# Patient Record
Sex: MALE | Employment: UNEMPLOYED | URBAN - METROPOLITAN AREA
[De-identification: names, ages, dates, MRNs, and addresses within clinical notes are randomized per-mention and may not be internally consistent; named-entity substitution may affect disease eponyms.]

---

## 2021-02-22 ENCOUNTER — TRANSFERRED RECORDS (OUTPATIENT)
Dept: HEALTH INFORMATION MANAGEMENT | Facility: CLINIC | Age: 31
End: 2021-02-22

## 2021-02-22 LAB
CREAT SERPL-MCNC: 0.85 MG/DL (ref 0.6–1.3)
GFR SERPL CREATININE-BSD FRML MDRD: 105 ML/MIN/1.73M2
GLUCOSE SERPL-MCNC: 138 MG/DL (ref 78–113)
PHQ9 SCORE: 9
POTASSIUM SERPL-SCNC: 3.9 MMOL/L (ref 3.7–5)
TSH SERPL-ACNC: 1.5 UIU/ML (ref 0.5–6)

## 2021-02-23 ENCOUNTER — HOSPITAL ENCOUNTER (INPATIENT)
Facility: HOSPITAL | Age: 31
LOS: 3 days | Discharge: NURSING, REHAB OR LONG-TERM CARE | End: 2021-02-26
Attending: PSYCHIATRY & NEUROLOGY | Admitting: PSYCHIATRY & NEUROLOGY
Payer: COMMERCIAL

## 2021-02-23 ENCOUNTER — TRANSFERRED RECORDS (OUTPATIENT)
Dept: HEALTH INFORMATION MANAGEMENT | Facility: CLINIC | Age: 31
End: 2021-02-23

## 2021-02-23 ENCOUNTER — TELEPHONE (OUTPATIENT)
Dept: BEHAVIORAL HEALTH | Facility: CLINIC | Age: 31
End: 2021-02-23

## 2021-02-23 PROBLEM — F29 PSYCHOSIS (H): Status: ACTIVE | Noted: 2021-02-23

## 2021-02-23 PROBLEM — Z79.899 HIGH RISK MEDICATION USE: Status: ACTIVE | Noted: 2018-08-28

## 2021-02-23 PROBLEM — J45.20 MILD INTERMITTENT ASTHMA IN ADULT WITHOUT COMPLICATION: Status: ACTIVE | Noted: 2021-02-23

## 2021-02-23 PROCEDURE — 99223 1ST HOSP IP/OBS HIGH 75: CPT | Performed by: NURSE PRACTITIONER

## 2021-02-23 PROCEDURE — 124N000001 HC R&B MH

## 2021-02-23 RX ORDER — ACETAMINOPHEN 325 MG/1
650 TABLET ORAL EVERY 4 HOURS PRN
Status: DISCONTINUED | OUTPATIENT
Start: 2021-02-23 | End: 2021-02-26 | Stop reason: HOSPADM

## 2021-02-23 RX ORDER — ALBUTEROL SULFATE 90 UG/1
2 AEROSOL, METERED RESPIRATORY (INHALATION) EVERY 6 HOURS PRN
Status: DISCONTINUED | OUTPATIENT
Start: 2021-02-23 | End: 2021-02-26 | Stop reason: HOSPADM

## 2021-02-23 RX ORDER — LANOLIN ALCOHOL/MO/W.PET/CERES
3-6 CREAM (GRAM) TOPICAL
Status: DISCONTINUED | OUTPATIENT
Start: 2021-02-23 | End: 2021-02-26 | Stop reason: HOSPADM

## 2021-02-23 RX ORDER — HYDROXYZINE HYDROCHLORIDE 25 MG/1
50-100 TABLET, FILM COATED ORAL EVERY 6 HOURS PRN
Status: DISCONTINUED | OUTPATIENT
Start: 2021-02-23 | End: 2021-02-26 | Stop reason: HOSPADM

## 2021-02-23 RX ORDER — OLANZAPINE 5 MG/1
5-10 TABLET ORAL 3 TIMES DAILY PRN
Status: DISCONTINUED | OUTPATIENT
Start: 2021-02-23 | End: 2021-02-26 | Stop reason: HOSPADM

## 2021-02-23 RX ORDER — IBUPROFEN 200 MG
200-400 TABLET ORAL EVERY 6 HOURS PRN
Status: DISCONTINUED | OUTPATIENT
Start: 2021-02-23 | End: 2021-02-24

## 2021-02-23 RX ORDER — POLYETHYLENE GLYCOL 3350 17 G/17G
17 POWDER, FOR SOLUTION ORAL DAILY PRN
Status: DISCONTINUED | OUTPATIENT
Start: 2021-02-23 | End: 2021-02-26 | Stop reason: HOSPADM

## 2021-02-23 RX ORDER — TRAZODONE HYDROCHLORIDE 50 MG/1
50 TABLET, FILM COATED ORAL
Status: DISCONTINUED | OUTPATIENT
Start: 2021-02-23 | End: 2021-02-23

## 2021-02-23 RX ORDER — MAGNESIUM HYDROXIDE/ALUMINUM HYDROXICE/SIMETHICONE 120; 1200; 1200 MG/30ML; MG/30ML; MG/30ML
30 SUSPENSION ORAL EVERY 4 HOURS PRN
Status: DISCONTINUED | OUTPATIENT
Start: 2021-02-23 | End: 2021-02-26 | Stop reason: HOSPADM

## 2021-02-23 ASSESSMENT — ACTIVITIES OF DAILY LIVING (ADL)
DRESS: SCRUBS (BEHAVIORAL HEALTH)
DOING_ERRANDS_INDEPENDENTLY_DIFFICULTY: NO
FALL_HISTORY_WITHIN_LAST_SIX_MONTHS: NO
HYGIENE/GROOMING: INDEPENDENT;SHOWER
LAUNDRY: UNABLE TO COMPLETE
ORAL_HYGIENE: INDEPENDENT
HYGIENE/GROOMING: INDEPENDENT
DRESS: INDEPENDENT;SCRUBS (BEHAVIORAL HEALTH)
ORAL_HYGIENE: INDEPENDENT

## 2021-02-23 ASSESSMENT — MIFFLIN-ST. JEOR: SCORE: 1734.71
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
## 2021-02-23 NOTE — PROGRESS NOTES
02/23/21 1326   Patient Belongings   Did you bring any home meds/supplements to the hospital?  Yes   Disposition of meds  Sent to security/pharmacy per site process   Patient Belongings locker;sent to security per site process   Patient Belongings Put in Hospital Secure Location (Security or Locker, etc.) clothing;medication(s);shoes   Belongings Search Yes   Clothing Search Yes   Second Staff Alba RUBIN   Comment jorgensen vest, brown belt, brown leather jacket, blue tennis shoes, grey jeans, grey and black veronica t-shirt, white tank top, 1 black sock, 1 striped sock, 1 grey glove, 1 white mask, sunglasses, oricale book and cards, altiods can with 2 dice and several misc. items, bic lighter,razor, container with silver chain, safety pin and several charms, 1 chop stick, bottle of refresher oil, bag with 1 rock, hair tie, earring, purple rubber band   List items sent to safe: [place] instruction permit    2 items sent to weapons security: pocket knife and file    All other belongings put in assigned cubby in belongings room.       I have reviewed my belongings list on admission and verify that it is correct.     Patient signature_______________________________    Second staff witness (if patient unable to sign) ______________________________       I have received all my belongings at discharge.    Patient signature________________________________    Elise   2/23/2021  1:34 PM

## 2021-02-23 NOTE — PLAN OF CARE
"ADMISSION NOTE    Reason for admission- psychosis  Safety concerns- 15 minute safety checks  Risk for or history of violence -none reported.   Full skin assessment: completed, no skin issues noted, faint superficial laceration marks on inner aspect of left wrist.    Patient arrived on unit from [facility] accompanied by ambulance crew and security on 2/23/2021  12:24 PM.   Status on arrival: awake, alert, semi-cooperative.  Temp 98.7  F (37.1  C) (Tympanic)   Resp 20   Ht 1.76 m (5' 9.29\")   Wt 78.5 kg (173 lb)   BMI 25.33 kg/m    Patient given tour of unit and Welcome to  unit papers given to patient, wanding completed, belongings inventoried, and admission assessment completed. Pt. Refused vital signs, jumped into shower immediately when shown to room, did allow skin check, changed into scrubs.  Patient's legal status on arrival is 72 hour hold. Appropriate legal rights discussed with and copy given to patient. Patient Bill of Rights discussed with and copy given to patient.   Patient denies SI, HI, and thoughts of self harm and contracts for safety while on unit.      Chelsea Desai RN  2/23/2021  1:08 PM      1430-  Pt. Arrived from [facility] ED at 12:24 pm, escorted to room, pt. Immediately jumped into shower and spent 15 minutes in shower, dressed in clean scrubs, refused vital signs, stating \"It's against my eclectic wicken Faith\" cooperative with most other admission assessments, polite and cooperative, ate 75% of lunch, ambulated back to room  Per pt. Request, blank paper and washable markers given to pt. Per request, spending time  In room coloring, is able to wean to open unit per AISHA Wray, U-tox negative, states \"I have black mold in my apartment and it's been causing me memory problems\", refused to sign zeenat forms.    Face to face end of shift report will be communicated to oncoming afternoon shift RN.     Chelsea Desai RN  2/23/2021  2:23 PM          "
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
## 2021-02-23 NOTE — TELEPHONE ENCOUNTER
Patient cleared and ready for behavioral bed placement: No     S Pt is a 30 year old nonbinary pt at the [facility] ed. Pt prefers to go by name Atiya.    B Pt has a history of schizophrenia. Pt is manic and hyperverbal in ed. Pt bib in by police after an argument at his parents house. Pt lives in his own apartment but the only bag of food was a bag of peas in pt home. Pt has been decompensating mentally. Pt has been having increased hallucinations and paranoia after stopping his antipsychotic medications. Pt says there is black mold in his apartment causing him to lose his memory.  Pt says he has not slept for 27 days. Pt denies SI and HI. However, pt made a statement about killing himself with a back of a toilet. Pt also making many religiously preoccupied statements. Pt removed a battery from clock on wall and ed unable to find battery. Pt took off his clothes in ed due to Cheondoism reasons. UTOX is negative. COVID19 negative. Labs within normal limits. Pt has no legal issues.     A 72hh 2/2 730pm signed    R EDDIE/Endy    - 732am intake informed ed that pt is not medically stable until after xray as pt may have inserted or eaten battery. Informed pt is not having a bed held but intake keeping clinical and will re-review after xray  - 829am pt xray is completed and pt did not ingest battery  - 829am intake called [facility] as pt does not have documented vital signs due to refusing. Spoke with ed rn Adina who could not obtain vitals due to pt refusing. Only temp  - unit and ed aware of admission ready for report now 919am    T 97.5

## 2021-02-23 NOTE — H&P
[facility]    History and Physical  Medical Services       Date of Admission:  2/23/2021  Date of Service (when I saw the patient): 02/23/21    Assessment & Plan     Principal Problem:    Psychosis (H)    Active Medical Problems:    Mild intermittent asthma in adult without complication- pt reports he seldom needs to use his albuterol inhaler and states that he does not have an inhaler at home. Denies chest pain, sob, difficulty breathing. Ordered prn inhaler    Transgender- pt reports she was previously on hormones and spirolactone in the past and would like to see endocrinology about restarting these. Explained that since she has been off of these for some time (she can't recall the last time) she would need to follow up with endocrinology at discharge for restarting.    covid- negative    Pt medically stable, no acute medical concerns. Chronic medical problems stable. Will sign off. Please consult for any new medical issues or concerns.       Code Status: Full Code    Gloria James, CNP    Primary Care Physician   Physician No Ref-Primary    Chief Complaint   Psych evaluation     History is obtained from the patient and medical chart     History of Present Illness    (per intake)  Pt is a 30 year old nonbinary pt at the [facility] ed. Pt prefers to go by name Atiya. Pt has a history of schizophrenia. Pt is manic and hyperverbal in ed. Pt bib in by police after an argument at his parents house. Pt lives in his own apartment but the only bag of food was a bag of peas in pt home. Pt has been decompensating mentally. Pt has been having increased hallucinations and paranoia after stopping his antipsychotic medications. Pt says there is black mold in his apartment causing him to lose his memory.  Pt says he has not slept for 27 days. Pt denies SI and HI. However, pt made a statement about killing himself with a back of a toilet. Pt also making many religiously preoccupied statements. Pt removed a  battery from clock on wall and ed unable to find battery. Pt took off his clothes in ed due to Taoism reasons. UTOX is negative. COVID19 negative. Labs within normal limits. Pt has no legal issues.     Past Medical History    I have reviewed this patient's medical history and updated it with pertinent information if needed.   Past Medical History:   Diagnosis Date     Mild intermittent asthma in adult without complication 2/23/2021       Past Surgical History   I have reviewed this patient's surgical history and updated it with pertinent information if needed.  No past surgical history on file.    Prior to Admission Medications   None     Allergies   Allergies   Allergen Reactions     Penicillins        Social History   I have reviewed this patient's social history and updated it with pertinent information if needed. Hao Harvey      Family History   I have reviewed this patient's family history and updated it with pertinent information if needed.   No family history on file.    Review of Systems   CONSTITUTIONAL:  negative  EYES:  negative  HEENT:  negative  RESPIRATORY:  negative  CARDIOVASCULAR:  negative  GASTROINTESTINAL:  negative  GENITOURINARY:  negative  INTEGUMENT/BREAST:  negative  HEMATOLOGIC/LYMPHATIC:  negative  ALLERGIC/IMMUNOLOGIC:  negative  ENDOCRINE:  negative  MUSCULOSKELETAL:  negative  NEUROLOGICAL:  negative    Physical Exam   Temp: 98.7  F (37.1  C) Temp src: Tympanic       Resp: 20        Vital Signs with Ranges  Temp:  [98.7  F (37.1  C)] 98.7  F (37.1  C)  Resp:  [20] 20  173 lbs 0 oz    Constitutional: awake, alert, cooperative, no apparent distress, and appears stated age  Eyes: Lids and lashes normal, pupils equal, round and reactive to light, extra ocular muscles intact, sclera clear, conjunctiva normal  ENT: Normocephalic, without obvious abnormality, atraumatic, external ears without lesions, oral pharynx with moist mucous membranes, no erythema or exudates  Hematologic /  Lymphatic: no cervical lymphadenopathy  Respiratory: No increased work of breathing, good air exchange, clear to auscultation bilaterally, no crackles or wheezing  Cardiovascular: Normal apical impulse, regular rate and rhythm, normal S1 and S2, no S3 or S4, and no murmur noted  GI: No scars, normal bowel sounds, soft, non-distended, non-tender, no masses palpated, no hepatosplenomegally  Genitounirinary: deferred  Skin: normal skin color, texture, turgor, no redness, warmth, or swelling and no rashes  Musculoskeletal: There is no redness, warmth, or swelling of the joints.  Full range of motion noted.  Neurologic: Awake, alert, oriented to name, place and time.    Neuropsychiatric: General: disorganized, tangential,  and normal eye contact    Data   Data reviewed today:   No lab results found in last 7 days.    No results found for this or any previous visit (from the past 24 hour(s)).

## 2021-02-24 PROCEDURE — 99233 SBSQ HOSP IP/OBS HIGH 50: CPT | Performed by: NURSE PRACTITIONER

## 2021-02-24 PROCEDURE — 124N000001 HC R&B MH

## 2021-02-24 PROCEDURE — 250N000013 HC RX MED GY IP 250 OP 250 PS 637: Performed by: NURSE PRACTITIONER

## 2021-02-24 RX ORDER — LITHIUM CARBONATE 300 MG/1
300 TABLET, FILM COATED, EXTENDED RELEASE ORAL AT BEDTIME
Status: DISCONTINUED | OUTPATIENT
Start: 2021-02-24 | End: 2021-02-25

## 2021-02-24 RX ADMIN — LITHIUM CARBONATE 300 MG: 300 TABLET, FILM COATED, EXTENDED RELEASE ORAL at 22:05

## 2021-02-24 ASSESSMENT — ACTIVITIES OF DAILY LIVING (ADL)
DRESS: SCRUBS (BEHAVIORAL HEALTH);INDEPENDENT
ORAL_HYGIENE: INDEPENDENT
ORAL_HYGIENE: INDEPENDENT
DRESS: INDEPENDENT
LAUNDRY: UNABLE TO COMPLETE
LAUNDRY: UNABLE TO COMPLETE
HYGIENE/GROOMING: INDEPENDENT
HYGIENE/GROOMING: INDEPENDENT

## 2021-02-24 NOTE — PROGRESS NOTES
"[facility]  Psychiatric Progress Note      Impression:     This is my first time meeting with her.  She is very isolative and does not come out of her room per staff's report.  She does not socialize much with other patients at all.  She has not attended any groups here and does not allow us to contact her family to gain collateral information and states \"they are very toxic people\".  She denies having any current suicidal thoughts and when I question her about the suicidal comments regarding knowing how to harm herself somehow with the toilet she states \"I was just pointing out that it could be done and that there should be a camera in that direction.  I was not suicidal.\"  Her hygiene is very poor her hair is greasy and she is malodorous.  She has quite an intense stare and a very blunted affect.  She is not delayed in her speech at all and it does not appear that her speech is pressured though is extremely monotone.  I did tell her that she seemed to have a very flat affect and extremely monotone speech which she did agree with and stated \"I kind of always do\" while we are talking I noticed that each time she is done talking she is saying something under her breath or having some type of tach.  She states she has never been diagnosed with a tic disorder.  I did ask her and told her why I was asking and that I had noticed that each time she was done talking she made some type of a noise each time she was done talking when she inhales.  Her mouth is moving at these times though I cannot tell what she is saying.  It almost seems as though she is repeating what she had answered during our conversation though she is not aware she is doing this.  She has no dystonia no pain in her neck area no abnormal neck movements or facial movements.  It would be helpful to try to contact family to see if they have ever noticed this before or if she is ever been diagnosed with any type of tic disorder though she does " "not want us contacting family and we do not have the contact information.  The county has already been involved and since there was not a danger factor they recommended a vulnerable adult form be filled out.  She does state that she is feeling very high anxiety though comes off is very blunted and extremely calm.  Almost tense appearing but calm.  She does state that that is how she feels \"tense inside but calm outwardly\".  She is not necessarily disorganized though is overly fixated on Presybeterian and cultural beliefs.  Is upset that we will not allow her to have a glass of wine here as she states it is part of her skin Hindu.  Also discussing how she does not have to wear shoes because she is part of the [place].  She does appear to be overly fixated or overly goal-directed on Presybeterian practices though does not seem overly goal-directed or hyper focused in other areas.    Educated regarding medication indications, risks, benefits, side effects, contraindications and possible interactions. Verbally expressed understanding.        Diagnoses:   Unspecified psychosis  Rule out bipolar disorder type I most recent episode mixed severe with psychotic and possible catatonic features        Attestation:  Patient has been seen and evaluated by me,  Eboni Ruvalcaba NP          Interim History:   The patient's care was discussed with the treatment team and chart notes were reviewed.            Medications:       lithium ER  300 mg Oral At Bedtime              10 point ROS negative        Allergies:     Allergies   Allergen Reactions     Penicillins             Psychiatric Examination:   Pulse 102   Temp 98.7  F (37.1  C) (Temporal)   Resp 18   Ht 1.76 m (5' 9.29\")   Wt 78.5 kg (173 lb)   SpO2 98%   BMI 25.33 kg/m    Weight is 173 lbs 0 oz  Body mass index is 25.33 kg/m .    Appearance:  awake, alert, unkempt and disheveled   Attitude:  guarded  Eye Contact:  intense  Mood:  anxious  Affect:  intensity " is blunted  Speech:  clear, coherent and decreased prosody  very monotone  Psychomotor Behavior:  no evidence of tardive dyskinesia, dystonia, or tics  Thought Process:  Perseverative on Pentecostal practices as well as cultural beliefs  Associations:  loosening of associations present  Thought Content: Denies suicidal ideation though reported suicidal statements within the past couple of days  Insight:  limited  Judgment:  poor  Oriented to:  time, person, and place  Attention Span and Concentration:  fair  Recent and Remote Memory:  fair  Fund of Knowledge: low-normal  Muscle Strength and Tone: normal  Gait and Station: Normal           Labs:   No labs needed today           Plan/Treatment Team     BEHAVIORAL TEAM DISCUSSION    Progress: None.  Remains isolative blunted and has poor hygiene    Continued Stay Criteria/Rationale: Starting lithium and monitoring affect and mood lability as well as suicidal thoughts    Medical/Physical: None    Precautions: None    Falls precaution?: No  Behavioral Orders   Procedures     Code 1 - Restrict to Unit     Routine Programming     As clinically indicated     Status 15     Every 15 minutes.                     Plan for this encounter/Med Changes/Labs:       Start lithium 300 mg nightly and increase as tolerated                Participants: Eboni Ruvalcaba NP,  nursing, Social work, OT          ELOS 3 to 5 days

## 2021-02-24 NOTE — PLAN OF CARE
"  Problem: Suicidal Behavior  Goal: Suicidal Behavior is Absent or Managed  Outcome: Improving     Problem: Thought Process Alteration  Goal: Optimal Thought Clarity  Outcome: Improving     Problem: Behavioral Health Plan of Care  Goal: Patient-Specific Goal (Individualization)  Description: Pt. Will attend at least 50% of group therapy sessions daily  Pt. Will provide ADL's independently daily  Pt. Will eat at least 50% of meals  Pt. Will comply with treatment team recommendations  Pt. Will take prescribed medications  2--  Pt. Is able to wean to open unit, must have appropriate behaviors and is cooperative with safety checks.    Pt in MHICU at the start of the shift. Pt answered nursing assessment questions cooperatively but not consistently. Pt asked if she had pain and answered \"yes\", when asked to rate her pain, pt stated \"0\". Pt denied all symptoms of anxiety, depression, SI, HI and hallucinations. Pt asked what she would like to work on while she is here, pt stated that \"to gain stability since they told her she was unstable\".     Pt asked if nursing could bring her more protein, pt stated that we should be following and appropriate diet for her needs that include more protein. Pt encouraged to talk to his provider about changing her diet. Pt became upset and stated \"are you saying you don't follow proper nutritional diets here?\" Nursing tried to explain about diet orders needing to go through the provider but pt interrupted stating \"I don't need a doctor to tell me about proper nutrition\".    Outcome: Improving  Note:     Face to face end of shift report communicated to night shift RN.     Darcy Peace RN  2/24/2021  4:03 PM          "

## 2021-02-24 NOTE — PLAN OF CARE
Problem: Behavioral Health Plan of Care  Goal: Patient-Specific Goal (Individualization)  Description: Pt. Will attend at least 50% of group therapy sessions daily  Pt. Will provide ADL's independently daily  Pt. Will eat at least 50% of meals  Pt. Will comply with treatment team recommendations  Pt. Will take prescribed medications  2--  Pt. Is able to wean to open unit, must have appropriate behaviors and is cooperative with safety checks.  Outcome: Improving     Face to face shift report received from RN. Rounding completed, pt observed.Client ate breakfast in room. Client not interested in weaning to front at this time. This recorder was unable to get much information from client through conversation. Client met with Liat ROSAS this morning. Liat stated that client is disorganized and was responding to unseen stimuli per our conversation. Client is unwilling to let staff perform a full set of vitals at one time. Client is requesting additional protein on meal trays. Client has been noted to walk about room and then rest on bed. Client has no scheduled medications for this shift. Face to face report will be communicated to oncoming RN.    Santos Shane RN  2/24/2021  11:10 AM
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
## 2021-02-25 VITALS
HEIGHT: 69 IN | BODY MASS INDEX: 25.62 KG/M2 | HEART RATE: 96 BPM | RESPIRATION RATE: 16 BRPM | OXYGEN SATURATION: 98 % | WEIGHT: 173 LBS | TEMPERATURE: 98.4 F

## 2021-02-25 PROCEDURE — 124N000001 HC R&B MH

## 2021-02-25 PROCEDURE — 99232 SBSQ HOSP IP/OBS MODERATE 35: CPT | Performed by: NURSE PRACTITIONER

## 2021-02-25 RX ORDER — LITHIUM CARBONATE 300 MG/1
600 TABLET, FILM COATED, EXTENDED RELEASE ORAL AT BEDTIME
Status: DISCONTINUED | OUTPATIENT
Start: 2021-02-25 | End: 2021-02-26 | Stop reason: HOSPADM

## 2021-02-25 RX ORDER — RAMELTEON 8 MG/1
8 TABLET ORAL
Status: DISCONTINUED | OUTPATIENT
Start: 2021-02-25 | End: 2021-02-26 | Stop reason: HOSPADM

## 2021-02-25 ASSESSMENT — ACTIVITIES OF DAILY LIVING (ADL)
ORAL_HYGIENE: INDEPENDENT
LAUNDRY: UNABLE TO COMPLETE
DRESS: SCRUBS (BEHAVIORAL HEALTH)
HYGIENE/GROOMING: INDEPENDENT
LAUNDRY: UNABLE TO COMPLETE
ORAL_HYGIENE: INDEPENDENT
HYGIENE/GROOMING: INDEPENDENT
DRESS: SCRUBS (BEHAVIORAL HEALTH);INDEPENDENT

## 2021-02-25 NOTE — PLAN OF CARE
Problem: Suicidal Behavior  Goal: Suicidal Behavior is Absent or Managed  2/25/2021 0745 by Santos Shane, RN  Outcome: Improving     Problem: Behavioral Health Plan of Care  Goal: Patient-Specific Goal (Individualization)  Description: Pt. Will attend at least 50% of group therapy sessions daily  Pt. Will provide ADL's independently daily  Pt. Will eat at least 50% of meals  Pt. Will comply with treatment team recommendations  Pt. Will take prescribed medications  2--  Pt. Is able to wean to open unit, must have appropriate behaviors and is cooperative with safety checks.  Outcome: Improving     Face to face shift report received from RN. Rounding completed, pt observed.Client ate breakfast in room this morning. Client has no scheduled AM medications. Client reminded that she does not have to stay in the back and was encouraged to attend unit programming. Client declined to do so. Her only request was a glass of tap water. Clients diet was changed to double protein per her request. Encouraged to complete ADL's as she appears unkempt with oily hair. Face to face report will be communicated to oncoming RN.    Santos Shane RN  2/25/2021  10:45 AM

## 2021-02-25 NOTE — PLAN OF CARE
"Met with pt this afternoon with NP- Pt reports poor sleep and talks about it being hard for anyone to sleep with a green wall and a night light on because this decreases Melatonin. Pt talked about how she thought the pill she took yesterday tasted like sugar. Pt states she needs more protein in her diet here. Pt says she would like something she enjoys to do at night when she can't sleep like violent \"video games or watch You Tube.\" Pt was informed this would not be possible.   "

## 2021-02-25 NOTE — PLAN OF CARE
"  Problem: Behavioral Health Plan of Care  Goal: Patient-Specific Goal (Individualization)  Description: Pt. Will attend at least 50% of group therapy sessions daily  Pt. Will provide ADL's independently daily  Pt. Will eat at least 50% of meals  Pt. Will comply with treatment team recommendations  Pt. Will take prescribed medications  2--  Pt. Is able to wean to open unit, must have appropriate behaviors and is cooperative with safety checks.  Outcome: No Change  Note: Report received from Danya. Zeferino complete. Pt observed laying in bed awake with multiple position changes and with regular and unlabored respirations. Pt offers multiple complaints listed in the thought process alteration section of this note. Pt is able to convey thoughts and concerns clearly to this writer and make needs known. Pt is pleasant and cooperative in requests, although can come across as somewhat paranoid that she is being withheld some requests when they are just not able to be fulfilled at this time due to availability on night shift. Pt is balanced and steady on her feet.   Problem: Thought Process Alteration  Goal: Optimal Thought Clarity  Outcome: No Change  Note: Pt reports c/o of being protein deficient. Writer asked pt if they were diagnosed with a specific disorder and pt stated, \"I just know this rash on my finger is because I'm low on protein.\" Pt showed writer some red raised markings on her inner aspect of her right ring finger. Pt asked about needing a high protein snack and was provided an egg salad sandwich, cheese stick, and carton of milk as this was all that was available at this time. Pt is pleasant and cooperative with writer at this time but does comment \"she knows we have protocols to follow\" as if we were withholding food form her. Writer advised that supply is based on what is left from snack time and that unfortunately this was all that is left. Pt asked that writer ask provider for a high protein diet " to be requested for her diet order as she feels that she needs this due to a disorder where she is insufficient in protein.Sticky note sent to provider regarding both concerns.   [id number]- Pt sleeping  [id number] Pt observed to be asleep in bed again.     Pt only slept approx 1 hour this NOC shift     Problem: Suicidal Behavior  Goal: Suicidal Behavior is Absent or Managed  Outcome: No Change  Note: Pt does not report  suicidal ideation at this time to this writer. Pt has remained free of self harm.        15 minute and PRN checks all night. No complaints offered. Will continue to monitor.    Face to face end of shift report communicated to oncoming RN.    February 25, 2021  3:21 AM

## 2021-02-25 NOTE — PLAN OF CARE
"  Problem: Suicidal Behavior  Goal: Suicidal Behavior is Absent or Managed  Outcome: Improving     Problem: Thought Process Alteration  Goal: Optimal Thought Clarity  Outcome: Improving     Problem: Behavioral Health Plan of Care  Goal: Patient-Specific Goal (Individualization)  Description: Pt. Will attend at least 50% of group therapy sessions daily  Pt. Will provide ADL's independently daily  Pt. Will eat at least 50% of meals  Pt. Will comply with treatment team recommendations  Pt. Will take prescribed medications  2--  Pt. Is able to wean to open unit, must have appropriate behaviors and is cooperative with safety checks.    Pt in MHICU at the start of the shift Pt has chosen not to wean stating \"I do not do well with other people\". Pt did state that she is bored and asked for more markers and paper. Pt was again encouraged to wean to the open unit explaining that she would have more access to markers and paper. Pt was given handouts for lithium and rozerem. Pt stated that she wants the info on the \"endocrenology of the medication\". Pt was told that we only have the hand out provided to us. Pt stated \"my doctor said you could provide that information and it is my right to have that information\". Pt was encouraged to talk to her doctor. Pt stated \"the provider that saw me today said that you have a book that nursing could give me on my medications\". Pt was again encouraged to talk to the provider and given the handouts.     Pt stated that she has liver issues and in her line of work sweats a lot and believes it is better if she doesn't take the lithium. Pt declined both her lithium and rozerum.     Pt became upset when staff did environmentals this shift. Pt told floor staff that she doesn't think staff is allowed to do that, referring to looking under her bed. Staff explained that they complete environmentals for safety. Pt stated that she didn't care and told staff to leave her room.    Outcome: " Improving  Note:       Face to face end of shift report communicated to night shift RN. Reported that pt is a suicide risk.     Darcy Peace RN  2/25/2021  4:59 PM

## 2021-02-25 NOTE — PROGRESS NOTES
"[facility]  Psychiatric Progress Note      Impression:     She continued to have very poor sleep last night.  She slept only 2 hours.  She continues to state that the color of the walls, which is an olive green, does not allow the pineal gland to secrete enough melatonin.  I then asked if she has ever tried melatonin for sleep and she states she has not it did not work.  I then asked if she is ever taken Rozerem and she states she has not when I tried to explain how Rozerem worked to help melatonin be more effective she said \"that is very interesting can you give me a handout on the metabolism of that medication as well as the neurotransmitters it affects\" I did tell her we would give her the patient handout on the medication.  She then tells me that she would sleep much better if she had enough protein in her diet.  I asked if she had any type of protein deficiency she was aware of and she stated that she does not but states \"for the last 27 days prior to coming in I had protein deficiency and eat red meat in order to feel well rested\" she seems a bit entitled and grandiose today.  Affect remains extremely flat and she is still mumbling under her breath right after she is done talking though I am unable to hear what she is saying.  It does not appear that she is responding to voices noted.  She is repeating what she is saying or repeating what I am saying.  She does not appear to be preoccupied by voices.  She denies having any suicidal thoughts.  She states that she did take her dose of lithium last night and showed me a couple of small scabbed areas on her fingers and stated \"I broke out in this rash from that medication\" I did tell her that it looks more like a contact dermatitis versus an allergic reaction to medication and she did agree to try it again but she will watch closely for this \"rash\" she does seem very hesitant in taking these medications.  She refuses to come out of the mental health " "intensive care unit.  We did tell her that there is books or magazines she could read and she could come and pick a book or magazine and she stated that there are only specific others that she likes to read listed them off and stated that we probably did not have these authors.  She also stated that she watches YouTube in order to calm herself down.  She made multiple comments insinuating we are not meeting her needs here because we are not allowing her to watch YouTube and because we will not allow .  She is aware that no other psychiatric units allow this to happen either.    Educated regarding medication indications, risks, benefits, side effects, contraindications and possible interactions. Verbally expressed understanding.        Diagnoses:   Unspecified psychosis  Rule out bipolar disorder type I most recent episode mixed severe with psychotic and possible catatonic features        Attestation:  Patient has been seen and evaluated by me,  Eboni Ruvalcaba NP          Interim History:   The patient's care was discussed with the treatment team and chart notes were reviewed.            Medications:       lithium ER  300 mg Oral At Bedtime              10 point ROS negative        Allergies:     Allergies   Allergen Reactions     Penicillins             Psychiatric Examination:   Pulse 96   Temp 97.7  F (36.5  C) (Temporal)   Resp 16   Ht 1.76 m (5' 9.29\")   Wt 78.5 kg (173 lb)   SpO2 98%   BMI 25.33 kg/m    Weight is 173 lbs 0 oz  Body mass index is 25.33 kg/m .    Appearance:  awake, alert, unkempt and disheveled   Attitude:  guarded  Eye Contact:  intense  Mood:  anxious  Affect:  intensity is blunted  Speech:  clear, coherent and decreased prosody  very monotone  Psychomotor Behavior:  no evidence of tardive dyskinesia, dystonia, or tics  Thought Process:  Perseverative on Sabianism practices as well as cultural beliefs  Associations:  loosening of associations present  Thought " Content: Denies suicidal ideation though reported suicidal statements within the past couple of days  Insight:  limited  Judgment:  poor  Oriented to:  time, person, and place  Attention Span and Concentration:  fair  Recent and Remote Memory:  fair  Fund of Knowledge: low-normal  Muscle Strength and Tone: normal  Gait and Station: Normal           Labs:   No labs needed today           Plan/Treatment Team     BEHAVIORAL TEAM DISCUSSION    Progress: None.  Remains isolative blunted and has poor hygiene    Continued Stay Criteria/Rationale: Starting lithium and monitoring affect and mood lability as well as suicidal thoughts    Medical/Physical: None    Precautions: None    Falls precaution?: No  Behavioral Orders   Procedures     Code 1 - Restrict to Unit     Routine Programming     As clinically indicated     Status 15     Every 15 minutes.                     Plan for this encounter/Med Changes/Labs:     Increase lithium.  Will monitor for rash though I do believe the cysts marks on her hands are contact dermatitis and they are hardly noticeable.  Does not appear to be a medication induced rash and she is agreeable to increase it though is going to be watching for this rash closely.            Participants: Eboni Ruvalcaba NP,  nursing, Social work, OT          ELOS 3 to 5 days

## 2021-02-26 PROCEDURE — 99239 HOSP IP/OBS DSCHRG MGMT >30: CPT | Performed by: NURSE PRACTITIONER

## 2021-02-26 ASSESSMENT — ACTIVITIES OF DAILY LIVING (ADL)
ORAL_HYGIENE: INDEPENDENT
DRESS: SCRUBS (BEHAVIORAL HEALTH)
HYGIENE/GROOMING: INDEPENDENT
LAUNDRY: UNABLE TO COMPLETE

## 2021-02-26 NOTE — DISCHARGE SUMMARY
"     Psychiatric Discharge Summary    Hao Harvey MRN# [id number]   Age: 30 year old YOB: 1990     Date of Admission:  2/23/2021  Date of Discharge:  2/26/2021  Admitting Physician:  José Luis Schumacher MD  Discharge Provider:  Eboni Ruvalcaba NP          Event Leading to Hospitalization and Hospital Stay   This patient is a 30 year old  non-binary male to female transgender adult with a significant past psychiatric history of  PTSD, gender identity disorder, IVDU, depression/dysthymia social anxiety disorder, BPD, and a questionable psychotic or bipolar disorder who presents with disorganization.  Arrived here from [facility]'s ED in [place] after patient was brought in by police due to an argument with her parents. Reportedly, Atiya has her own apartment, but there was no food available so she went back to her parent's house. While there, Atiya was making delusional statements about black mold in her apartment that was impacting her breathing and memory and reported people were stealing things from her. It is reported that Atiya has not taken any of her prescribed psychotropics for quite some time and is delusional and paranoid. The patient was medically cleared and admitted to our U for psychiatric evaluation and stabilization.         I met with Atiya and her nurse in the interview room. She is very difficult to follow and disorganized and tangential in conversation and thought. She is religiously preoccupied and refers often to her \"spiritual practices and heritage\" throughout our admission interview. She is not able to provide me with a cogent version of PTA events, other than she agreed to go the ED for a sleep study because \"I haven't slept in 27 days\" and \"my mom and dad were opening my mail, which they know is a felony, and they are mad because I called the .\" She is a poor historian and I am skeptical about the validity of some of the information " "she gives me. She tells me her psych meds make her suicidal and increase her blood sugar. It should be noted that all of her labs were WNL and review of her notes from [facility] indicate a dearth of chronic medical issues.      Review of the paper records from [facility] that accompanied her here corroborate that she did indeed come to the ED anticipating a sleep study, but they also talk about how Atiya made comments that she \"knew I could kill myself with the back of a toilet.\" The paper records also report that calls were made to [facility] to inquire about available community services and they were told that no services were available to Atiya because she was not suicidal or homicidal. They recommended filing a VA report and did not offer any other resources. Initially, Atiya was agreeable to admission to a mental health unit and psychiatric assessment, but as time wore on, she got impatient and refused vitals, blood tests, and even clothes and she sat naked on the floor in her ED room for some time before finally acquiescing. She also removed a battery from a clock that was on the wall and staff was not able to procure it. CXR was completed and there was no evidence of the battery and she continues to decline knowing what happened to it.      Reviewed notes from [facility] in Care Everywhere. Last saw Leyla Black CNP, in June of 2020 where she declined to restart her previously prescribed psychotropics (Trazodone, Seroquel and Risperdal) and asked for a referral to Behavioral Health for psychotherapy, but it appears that she did not follow through with that. She also received a referral to [facility] from Dr. Magana in September of 2019. Notes from Leyla Black CNP dated 2016 indicate she had been seeing Cortney Saavedra and Adwoa Ann at [facility]. Several attempts were made to get her established with Dr. Massey in Psychiatry since 2012, but when appointments " "opened up, she declined them. She has been seen for depression, hypersexuality, gender identity disorder, and other acute medical issues at [facility] since 2000.                Stay:        Fabi refused any medications while she was here though when we discussed her poor sleep and some symptoms that are congruent with jenn and she did agree that she would try lithium only because it was \"considered more natural\" she did take 1 dose and decided that \"it is just not the right medication for me.  I need to read the anatomy of the medications because I know how migraine works\".  She had no suicidal ideation through her stay.  It was unclear if she was hallucinating though after each sentence she would repeat something under her breath.  Initially it appeared to be more like a tic as she would do this with after every time she stopped talking.  It appeared to be the same phrase every time she stopped talking though I could not hear as it was in a very whispering tone and it appeared to be said when she was inhaling.  Possibly echoing what she had just stated though it is unclear.  She had a very flat blunted affect and each night only had 1 to 2 hours of sleep when she was here.  She had some grandiose 8 minutes and entitled requests such as being quite upset with her unit because we would not allow her to be watching YouTube or getting her specific books that her unit did not have while she was here so she could \"have some mykel in my life she refused to come out of the mental health intensive care unit because she stated books and YouTube are what helps her calm down and if she had those she would likely come out of her room and socialize.  She denied hallucinating and states she never has had hallucinations though she is very preoccupied with Wiccan culture/spirituality.  She does consider herself with them.  She was upset because we would not let her have wine every night and so she believes the " hospital should be allowing people to practice their Mu-ism believes.  She did not have any symptoms of withdrawal while she was here.  The county did not believe there is enough evidence to support a petition for commitment though if she returns to the hospital I would attempt to fill the petition out for failure to comply in an outpatient setting.  She was not discharged home on any medications as she does not believe she needs any medications.      Treatment Team Progress Note:   The patient's care was discussed with the treatment team and chart notes were reviewed.    Plan:  Patient is discharging at the recommendation of the treatment team.     Our team has made contact with  to ensure readiness for discharge.     At time of discharge, there is no evidence that patient is in immediate danger of self or others.        Diagnoses:   Unspecified bipolar disorder  Bipolar type 1 severe with psyhcotic features  R/o schizoaffective disorder  PTSD with dissociative episodes  Major depressive disorder, recurrent, severe with psychotic features   R/o unspecified psychotic disorder  Gender identity disorder  Borderline personality disorder            Labs:   No results found for any visits on 02/23/21.                 Discharge Medications:   There are no discharge medications for this patient.               Psychiatric Examination:   Appearance:  awake, alert and unkempt  Attitude:  guarded  Eye Contact:  intense  Mood:  restricted  Affect:  intensity is blunted  Speech:  clear, coherent though at end of each sentence she whispers the same uterance which I am unable to hear  Psychomotor Behavior:  no evidence of tardive dyskinesia, dystonia, or tics  Thought Process:  disorganized  Associations:  loosening of associations present  Thought Content:  no evidence of suicidal ideation or homicidal ideation and patient appears to be responding to internal stimuli  Insight:  poor  Judgment:  limited  Oriented to:  time,  person, and place  Attention Span and Concentration:  limited  Recent and Remote Memory:  fair  Fund of Knowledge: low-normal  Muscle Strength and Tone: normal  Gait and Station: Normal          Discharge Plan:       Discharging to homeless shelter.   County does not feel there is enough criteria for petition for commitment. If she returns, there is high possibility we will try to file for failure of outpatient services. At this time poses no risk to self or others.               Attestation:  The patient has been seen and evaluated by me,  Eboni Ruvalcaba NP         Discharge Services Provided:    40   minutes spent on discharge services, including:  Final examination of patient.  Review and discussion of Hospital stay.  Instructions for continued outpatient care/goals.  Preparation of discharge records.  Preparation of medications refills and new prescriptions.  Preparation of Applicable referral forms.

## 2021-02-26 NOTE — PLAN OF CARE
Problem: Behavioral Health Plan of Care  Goal: Patient-Specific Goal (Individualization)  Description: Pt. Will attend at least 50% of group therapy sessions daily  Pt. Will provide ADL's independently daily  Pt. Will eat at least 50% of meals  Pt. Will comply with treatment team recommendations  Pt. Will take prescribed medications  2--  Pt. Is able to wean to open unit, must have appropriate behaviors and is cooperative with safety checks.  Outcome: No Change     Problem: Suicidal Behavior  Goal: Suicidal Behavior is Absent or Managed  Outcome: Improving     Face to face shift report received from RN Rounding completed, pt observed.    Client is eating meals. She slept poorly last night. Client refused lithium and was offered PRN for sleep which was also declined. Client has shown no interest in leaving room or participating in any programming. Client will tentatively discharge today. Client has made no suicidal gestures or verbalizations. Face to face report will be communicated to oncoming RN.    Santos Shane RN  2/26/2021  1:14 PM

## 2021-02-26 NOTE — PLAN OF CARE
Problem: Behavioral Health Plan of Care  Goal: Patient-Specific Goal (Individualization)  Description: Pt. Will attend at least 50% of group therapy sessions daily  Pt. Will provide ADL's independently daily  Pt. Will eat at least 50% of meals  Pt. Will comply with treatment team recommendations  Pt. Will take prescribed medications  2--  Pt. Is able to wean to open unit, must have appropriate behaviors and is cooperative with safety checks.  Outcome: Declining  Note: Report received from Danya. Rounding complete. Pt observed in room awake, sometimes pacing and sometimes just laying in bed awake. Pt stated she can't sleep without coffee and other things she usually has at home. Pt is condescending and dismissive at times and polite and pleasant at others. She does seem to be trying to keep herself awake throughout the night. It appears when she starts to look like she is falling asleep while laying in bed that this is when she gets up and starts pacing. When writer advised pt that she should try to sleep she stated again that she does not feel like it or want to at the earlier part of the shift. Writer again asked pt at approx 0330 and she again stated that she doesn't think she'll be able to sleep. Pt only slept 1 hour yesterday and 2 hours the night before. Per check sheets it does not appear that this pt is sleeping on any other shifts either. Pt lack of sleep does seem to be affecting her mood as she does seem more accusatory and condescending tonight than last night.     0400- Pt in bed with blanket on and does appear to be attempting to go to sleep at this time, but she is still awake.    15 minute and PRN checks all night. No complaints offered. Will continue to monitor. Pt only slept approx 2 hours this NOC shift    Face to face end of shift report communicated to oncoming RN.    February 26, 2021  3:50 AM          Problem: Thought Process Alteration  Goal: Optimal Thought Clarity  Outcome:  "Declining  Note: Pt is able to make needs known but is dismissive and condescending when advised that requests do not follow unit policy. Such as coffee at night. Writer advised pt of unit times that coffee is available to her and pt stated she needs coffee to sleep at night. Pt stated that we better not be drinking coffee if she is not allowed as a pt to have coffee. Pt is advised of the need to sleep for mental wellness and that we cannot provide coffee to her on night shift. Pt then advised, \"I'm aware that you guys have a policy of lying to pt's about what they can and cannot have here.\" Pt was again reminded that we have provided her a copy of the unit rules that give times and availability of everything on the unit. Pt also advised that writer is available if she has any questions. Pt still upset that we will not give her caffeinated coffee.Per evening shift pt refused both her lithium and rozarem. Writer is unsure if these behaviors are just behaviors or if pt is actually experiencing some paranoia or delusions that staff is withholding things from her that she thinks she should be allowed.     Problem: Suicidal Behavior  Goal: Suicidal Behavior is Absent or Managed  Outcome: Declining  Note: Pt does not verbalize suicidal ideation to this writer and has remained free of injury and self harm.      "

## 2021-02-26 NOTE — PLAN OF CARE
Discharge Note    Patient Discharged to [facility] on 2/26/2021 3:00 PM [facility] accompanied by unit staff to transport.    Patient informed of discharge instructions in AVS. patient verbalizes understanding and denies having any questions pertaining to AVS. Patient stable at time of discharge. Patient denies SI, HI, and thoughts of self harm at time of discharge. All personal belongings returned to patient.Client prescribed no medications.  Santos Shane RN  2/26/2021  3:18 PM

## 2021-02-26 NOTE — DISCHARGE INSTRUCTIONS
Behavioral Discharge Planning and Instructions    Summary: Patient was admitted with increased mental health symptoms     Main Diagnosis: PTSD with dissociative episodes  Major depressive disorder, recurrent, severe with psychotic features   R/o unspecified psychotic disorder  Gender identity disorder  Borderline personality disorder    Health Care Follow-up:     [facility]    [address] [place] [address]  Phone: [phone]    [facility]    [address] [place] [address]  Phone: [phone]    [facility]   Transport: [phone]    *Follow up not arranged per pt's request*    Attend all scheduled appointments with your outpatient providers. Call at least 24 hours in advance if you need to reschedule an appointment to ensure continued access to your outpatient providers.     Major Treatments, Procedures and Findings:  You were provided with: a psychiatric assessment, assessed for medical stability, medication evaluation and/or management and group therapy    Symptoms to Report: feeling more aggressive, increased confusion, losing more sleep, mood getting worse or thoughts of suicide    Early warning signs can include: increased depression or anxiety sleep disturbances increased thoughts or behaviors of suicide or self-harm  increased unusual thinking, such as paranoia or hearing voices    Safety and Wellness:  Take all medicines as directed.  Make no changes unless your doctor suggests them.      Follow treatment recommendations.  Refrain from alcohol and non-prescribed drugs.  Ask your support system to help you reduce your access to items that could harm yourself or others. If there is a concern for safety, call 911.    Resources:   Crisis Intervention: 690.163.4861 or 725-619-7019 (TTY: 321.384.5197).  Call anytime for help.  National Laura on Mental Illness (www.[place].howard.org): 258.263.1737 or 015-059-3716.  Alcoholics Anonymous (www.alcoholics-anonymous.org): Check your phone book  "for your local chapter.  Suicide Awareness Voices of Education (SAVE) (www.save.org): 194-474-RYWM (2319)  National Suicide Prevention Line (www.mentalhealthmn.org): 823-244-LAVA (2425)  Mental Health Consumer/Survivor Network of [place] (www.mhcsn.net): 799.769.2822 or 544-384-3818  Mental Health Association of [place] (www.mentalhealth.org): 228.601.8226 or 176-609-4962  Self- Management and Recovery Training., SMART-- Toll free: 212.642.3577  [employer]  Text 4 Life: txt \"LIFE\" to 41553 for immediate support and crisis intervention  Crisis text line: Text \"[place]\" to 110664. Free, confidential, 24/7.  Crisis Intervention: 691.689.1938 or 958-355-0518. Call anytime for help.     General Medication Instructions:   See your medication sheet(s) for instructions.   Take all medicines as directed.  Make no changes unless your doctor suggests them.   Go to all your doctor visits.  Be sure to have all your required lab tests. This way, your medicines can be refilled on time.  Do not use any drugs not prescribed by your doctor.  Avoid alcohol.    Advance Directives:   Scanned document on file with [facility]? No scanned doc  Is document scanned? Pt states no documents  Honoring Choices Your Rights Handout: Informed and given  Was more information offered? Pt declined    The Treatment team has appreciated the opportunity to work with you. If you have any questions or concerns about your recent admission, you can contact the unit which can receive your call 24 hours a day, 7 days a week. They will be able to get in touch with a Provider if needed. The unit number is [phone] .    [place] Area:  [facility], [facility] stabilization [facility]- [phone]  Count includes the [facility] Crisis Line: 1-188.861.3580  Advocates For Family Peace: 449-2394  Sexual Assault Program [facility]: 349.648.7410 or 1-954.567.5753  [place] [facility]attered [facility]: 6-180-124-9961 Ext: 279       Calls answered Mon-Fri-8:00 am--4:30 pm    [place] " "[place]:  Advocates for Family Peace: 2-768-560-7540  [facility] - 0-568-677-0726  [facility] first call for help: 5-914-976-9684  [facility]:  (814) 466-6990    [place] Area:  Warm Line: 1-263.420.1218       Calls answered Tuesday--Saturday 4:00 pm--10:00 pm  [facility] Crisis Line - 479.345.6647  [place] [facility] 349-252-5657    [place] Statewide:  [place] Crisis and Referral Services: 1-677-872-1235  National Suicide Prevention Lifeline: 5-659-291-TALK (9073)   - pnr3ekkx- Text \"Life\" to 12032  First Call for Help: 2-1-1  [facility] Helpline- 7-384-TQFW-HELP   Crisis Text Line: Text  [place]  to 435535  "

## 2021-02-26 NOTE — PLAN OF CARE
Spoke with pt this afternoon- asked which shelter she would like her transport set up for- she states she likes to go to [facility] in [place]- will arrange ride with pt's insurance.     Arranged transport with pt's insurance- [employer] will pick pt up at 3:45ish and bring her to the [facility] in [place]. Updated pt's nurse    Pt is discharging at the recommendation of the treatment team. Pt is discharging to [facility] by [employer]. Pt denies having any thoughts of hurting themself or anyone else. Pt denies anxiety or depression.

## 2022-12-17 ENCOUNTER — TRANSFERRED RECORDS (OUTPATIENT)
Dept: HEALTH INFORMATION MANAGEMENT | Facility: CLINIC | Age: 32
End: 2022-12-17

## 2022-12-17 ENCOUNTER — HOSPITAL ENCOUNTER (INPATIENT)
Facility: HOSPITAL | Age: 32
LOS: 11 days | Discharge: HOME | End: 2022-12-28
Attending: NURSE PRACTITIONER | Admitting: STUDENT IN AN ORGANIZED HEALTH CARE EDUCATION/TRAINING PROGRAM
Payer: COMMERCIAL

## 2022-12-17 DIAGNOSIS — F32.2 SEVERE MAJOR DEPRESSION WITHOUT PSYCHOTIC FEATURES (H): Primary | ICD-10-CM

## 2022-12-17 LAB
ALT SERPL-CCNC: 47 U/L (ref 4–30)
AST SERPL-CCNC: 70 U/L (ref 17–33)
CREATININE (EXTERNAL): 0.89 MG/DL (ref 0.6–1.3)
GFR ESTIMATED (EXTERNAL): >90 ML/MIN/1.73M2
GLUCOSE (EXTERNAL): 86 MG/DL (ref 78–113)
POTASSIUM (EXTERNAL): 3.9 MEQ/L (ref 3.7–5)
TSH SERPL-ACNC: 2.01 UIU/ML (ref 0.5–6)

## 2022-12-17 PROCEDURE — 124N000001 HC R&B MH

## 2022-12-17 RX ORDER — AMOXICILLIN 250 MG
1 CAPSULE ORAL 2 TIMES DAILY PRN
Status: DISCONTINUED | OUTPATIENT
Start: 2022-12-17 | End: 2022-12-28 | Stop reason: HOSPADM

## 2022-12-17 RX ORDER — OLANZAPINE 10 MG/2ML
10 INJECTION, POWDER, FOR SOLUTION INTRAMUSCULAR 3 TIMES DAILY PRN
Status: DISCONTINUED | OUTPATIENT
Start: 2022-12-17 | End: 2022-12-28 | Stop reason: HOSPADM

## 2022-12-17 RX ORDER — OLANZAPINE 10 MG/1
10 TABLET ORAL 3 TIMES DAILY PRN
Status: DISCONTINUED | OUTPATIENT
Start: 2022-12-17 | End: 2022-12-28 | Stop reason: HOSPADM

## 2022-12-17 RX ORDER — HYDROXYZINE HYDROCHLORIDE 25 MG/1
25 TABLET, FILM COATED ORAL EVERY 4 HOURS PRN
Status: DISCONTINUED | OUTPATIENT
Start: 2022-12-17 | End: 2022-12-28 | Stop reason: HOSPADM

## 2022-12-17 RX ORDER — ACETAMINOPHEN 325 MG/1
650 TABLET ORAL EVERY 4 HOURS PRN
Status: DISCONTINUED | OUTPATIENT
Start: 2022-12-17 | End: 2022-12-28 | Stop reason: HOSPADM

## 2022-12-17 RX ORDER — LANOLIN ALCOHOL/MO/W.PET/CERES
3 CREAM (GRAM) TOPICAL
Status: DISCONTINUED | OUTPATIENT
Start: 2022-12-17 | End: 2022-12-28 | Stop reason: HOSPADM

## 2022-12-17 RX ORDER — MAGNESIUM HYDROXIDE/ALUMINUM HYDROXICE/SIMETHICONE 120; 1200; 1200 MG/30ML; MG/30ML; MG/30ML
30 SUSPENSION ORAL EVERY 4 HOURS PRN
Status: DISCONTINUED | OUTPATIENT
Start: 2022-12-17 | End: 2022-12-28 | Stop reason: HOSPADM

## 2022-12-17 ASSESSMENT — ACTIVITIES OF DAILY LIVING (ADL)
FALL_HISTORY_WITHIN_LAST_SIX_MONTHS: NO
WALKING_OR_CLIMBING_STAIRS_DIFFICULTY: NO
DIFFICULTY_EATING/SWALLOWING: NO
TOILETING_ISSUES: NO
WEAR_GLASSES_OR_BLIND: NO
CONCENTRATING,_REMEMBERING_OR_MAKING_DECISIONS_DIFFICULTY: NO
DOING_ERRANDS_INDEPENDENTLY_DIFFICULTY: NO
ADLS_ACUITY_SCORE: 40
DRESSING/BATHING_DIFFICULTY: NO
CHANGE_IN_FUNCTIONAL_STATUS_SINCE_ONSET_OF_CURRENT_ILLNESS/INJURY: NO

## 2022-12-18 PROBLEM — F32.2 SEVERE MAJOR DEPRESSION WITHOUT PSYCHOTIC FEATURES (H): Status: ACTIVE | Noted: 2022-12-18

## 2022-12-18 PROCEDURE — 99223 1ST HOSP IP/OBS HIGH 75: CPT | Mod: AI | Performed by: PSYCHIATRY & NEUROLOGY

## 2022-12-18 PROCEDURE — 250N000013 HC RX MED GY IP 250 OP 250 PS 637: Performed by: PSYCHIATRY & NEUROLOGY

## 2022-12-18 PROCEDURE — 124N000001 HC R&B MH

## 2022-12-18 RX ORDER — TRAZODONE HYDROCHLORIDE 100 MG/1
100 TABLET ORAL AT BEDTIME
Status: DISCONTINUED | OUTPATIENT
Start: 2022-12-18 | End: 2022-12-19

## 2022-12-18 RX ORDER — VENLAFAXINE HYDROCHLORIDE 150 MG/1
150 CAPSULE, EXTENDED RELEASE ORAL
Status: DISCONTINUED | OUTPATIENT
Start: 2022-12-19 | End: 2022-12-19

## 2022-12-18 RX ORDER — CLONAZEPAM 1 MG/1
1 TABLET ORAL 2 TIMES DAILY PRN
Status: DISCONTINUED | OUTPATIENT
Start: 2022-12-18 | End: 2022-12-28 | Stop reason: HOSPADM

## 2022-12-18 RX ORDER — ALBUTEROL SULFATE 90 UG/1
2 AEROSOL, METERED RESPIRATORY (INHALATION) 4 TIMES DAILY PRN
Status: DISCONTINUED | OUTPATIENT
Start: 2022-12-18 | End: 2022-12-19

## 2022-12-18 RX ORDER — TRAZODONE HYDROCHLORIDE 50 MG/1
50 TABLET, FILM COATED ORAL AT BEDTIME
Status: DISCONTINUED | OUTPATIENT
Start: 2022-12-18 | End: 2022-12-19

## 2022-12-18 RX ADMIN — TRAZODONE HYDROCHLORIDE 100 MG: 100 TABLET ORAL at 20:11

## 2022-12-18 ASSESSMENT — ACTIVITIES OF DAILY LIVING (ADL)
ADLS_ACUITY_SCORE: 40
ADLS_ACUITY_SCORE: 40
HYGIENE/GROOMING: INDEPENDENT
DRESS: SCRUBS (BEHAVIORAL HEALTH);INDEPENDENT
ADLS_ACUITY_SCORE: 40
ADLS_ACUITY_SCORE: 40
LAUNDRY: UNABLE TO COMPLETE
ADLS_ACUITY_SCORE: 40
HYGIENE/GROOMING: INDEPENDENT
ADLS_ACUITY_SCORE: 40
ORAL_HYGIENE: INDEPENDENT
ADLS_ACUITY_SCORE: 40
ORAL_HYGIENE: INDEPENDENT
ADLS_ACUITY_SCORE: 40
ADLS_ACUITY_SCORE: 40
DRESS: INDEPENDENT
LAUNDRY: UNABLE TO COMPLETE

## 2022-12-18 NOTE — PLAN OF CARE
SHIFT SUMMARY:  Calm and cooperative. Isolative and withdrawn. Experiences occasional suicidal ideation, but without a plan. Contracts for safety. Full-range affect. Not attending group.      Problem: Adult Behavioral Health Plan of Care  Goal: Patient-Specific Goal (Individualization)  Description: Patient will sleep 6 to 8 hours per night  Patient will eat at least 50% of meals  Patient will attend at least 50% of groups  Patient will comply with recommendations of treatment team  Patient will remain medication compliant    Outcome: Progressing     Problem: Suicidal Behavior  Goal: Suicidal Behavior is Absent or Managed  Description: Pt will be free of self harm while on unit.   Outcome: Progressing     Problem: Suicide Risk  Goal: Absence of Self-Harm  Outcome: Progressing   Goal Outcome Evaluation:
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
## 2022-12-18 NOTE — PROGRESS NOTES
List items sent to safe:   [place]  [place] card  [employer] Card  [employer] Gift Card   Misc. Papers  [place] Passport    All other belongings put in assigned cubby in belongings room.     PER Gloria DEWEY LPN:  This Pt came in with many items not safe or contraband for security, although inappropriate for unit & these items are marked as contraband in Pt's personal belongings.      I have reviewed my belongings list on admission and verify that it is correct.     Patient signature_______________________________    Second staff witness (if patient unable to sign) ______________________________       I have received all my belongings at discharge.    Patient signature________________________________    Gloria DEWEY LPN...... Completed by Jose MURRY  12/18/2022  12:08 AM

## 2022-12-18 NOTE — PLAN OF CARE
Face to face end of shift report received from Renée GUPTA RN. Rounding completed. Patient observed in lounge.     Pt has been calm, cooperative this shift. She sits out in the lounge and interacts with peers/staff. She participates in the unit milieu with appropriate behaviors. She denied any SI/HI and AH/VH. Denied pain. She has been attending groups throughout the morning with active participation. She has not showered this shift. She is able to make her needs known. Frequent rounding.       Problem: Adult Behavioral Health Plan of Care  Goal: Patient-Specific Goal (Individualization)  Description: Patient will sleep 6 to 8 hours per night  Patient will eat at least 50% of meals  Patient will attend at least 50% of groups  Patient will comply with recommendations of treatment team  Patient will remain medication compliant    Outcome: Progressing     Problem: Suicidal Behavior  Goal: Suicidal Behavior is Absent or Managed  Description: Pt will be free of self harm while on unit.   Outcome: Progressing     Problem: Suicide Risk  Goal: Absence of Self-Harm  Outcome: Progressing         Sheree Car RN  12/18/2022  11:14 AM

## 2022-12-18 NOTE — PLAN OF CARE
Report received from outgoing staff.    Problem: Adult Behavioral Health Plan of Care  Goal: Patient-Specific Goal (Individualization)  Description: Patient will sleep 6 to 8 hours per night  Patient will eat at least 50% of meals  Patient will attend at least 50% of groups  Patient will comply with recommendations of treatment team  Patient will remain medication compliant  Outcome: Not Progressing     Problem: Suicidal Behavior  Goal: Suicidal Behavior is Absent or Managed  Description: Pt will be free of self harm while on unit.   Outcome: Not Progressing     Problem: Suicide Risk  Goal: Absence of Self-Harm  Outcome: Not Progressing     Rounds completed. Safety checks completed every 15 minutes throughout the night. Pt noted to be resting with eyes closed and easy resp. for 3 hours. No suicidal gestures or comments noted.    Face to face end of shift report to be communicated to oncoming RN.

## 2022-12-19 PROCEDURE — 99233 SBSQ HOSP IP/OBS HIGH 50: CPT | Performed by: STUDENT IN AN ORGANIZED HEALTH CARE EDUCATION/TRAINING PROGRAM

## 2022-12-19 PROCEDURE — 124N000001 HC R&B MH

## 2022-12-19 PROCEDURE — 250N000013 HC RX MED GY IP 250 OP 250 PS 637: Performed by: STUDENT IN AN ORGANIZED HEALTH CARE EDUCATION/TRAINING PROGRAM

## 2022-12-19 PROCEDURE — 250N000013 HC RX MED GY IP 250 OP 250 PS 637: Performed by: PSYCHIATRY & NEUROLOGY

## 2022-12-19 RX ORDER — TRAZODONE HYDROCHLORIDE 50 MG/1
50 TABLET, FILM COATED ORAL
Status: DISCONTINUED | OUTPATIENT
Start: 2022-12-19 | End: 2022-12-28 | Stop reason: HOSPADM

## 2022-12-19 RX ORDER — ALBUTEROL SULFATE 90 UG/1
1-2 AEROSOL, METERED RESPIRATORY (INHALATION) EVERY 4 HOURS PRN
Status: DISCONTINUED | OUTPATIENT
Start: 2022-12-19 | End: 2022-12-28 | Stop reason: HOSPADM

## 2022-12-19 RX ORDER — VENLAFAXINE HYDROCHLORIDE 75 MG/1
75 CAPSULE, EXTENDED RELEASE ORAL
Status: ON HOLD | COMMUNITY
Start: 2022-11-21 | End: 2022-12-28

## 2022-12-19 RX ORDER — MULTIPLE VITAMINS W/ MINERALS TAB 9MG-400MCG
1 TAB ORAL DAILY
Status: DISCONTINUED | OUTPATIENT
Start: 2022-12-20 | End: 2022-12-24

## 2022-12-19 RX ORDER — VENLAFAXINE HYDROCHLORIDE 75 MG/1
75 CAPSULE, EXTENDED RELEASE ORAL ONCE
Status: COMPLETED | OUTPATIENT
Start: 2022-12-19 | End: 2022-12-19

## 2022-12-19 RX ORDER — TRAZODONE HYDROCHLORIDE 50 MG/1
1-4 TABLET, FILM COATED ORAL
Status: ON HOLD | COMMUNITY
Start: 2022-11-21 | End: 2022-12-28

## 2022-12-19 RX ORDER — ALBUTEROL SULFATE 90 UG/1
1-2 AEROSOL, METERED RESPIRATORY (INHALATION) EVERY 4 HOURS PRN
COMMUNITY
Start: 2022-12-13

## 2022-12-19 RX ORDER — CLONAZEPAM 1 MG/1
1 TABLET ORAL 2 TIMES DAILY PRN
Status: ON HOLD | COMMUNITY
Start: 2022-11-21 | End: 2022-12-28

## 2022-12-19 RX ADMIN — VENLAFAXINE HYDROCHLORIDE 75 MG: 75 CAPSULE, EXTENDED RELEASE ORAL at 12:18

## 2022-12-19 RX ADMIN — VENLAFAXINE HYDROCHLORIDE 150 MG: 150 CAPSULE, EXTENDED RELEASE ORAL at 08:50

## 2022-12-19 ASSESSMENT — ACTIVITIES OF DAILY LIVING (ADL)
LAUNDRY: UNABLE TO COMPLETE
ADLS_ACUITY_SCORE: 40
ADLS_ACUITY_SCORE: 40
HYGIENE/GROOMING: INDEPENDENT
ADLS_ACUITY_SCORE: 40
ORAL_HYGIENE: INDEPENDENT
ADLS_ACUITY_SCORE: 40
DRESS: SCRUBS (BEHAVIORAL HEALTH);INDEPENDENT
ADLS_ACUITY_SCORE: 40
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
## 2022-12-19 NOTE — PROGRESS NOTES
"[facility] PSYCHIATRY  PROGRESS NOTE     SUBJECTIVE     Prior to interviewing the patient, I met with nursing and reviewed patient's clinical condition. We discussed clinical care both before and after the interview. I have reviewed the patient's clinical course by review of records including previous notes, labs, and vital signs.     Per nursing, the patient had the following behavioral events over the last 24-hours: none. Patient slept around 6 hours. Reports of SI at times.    On psychiatric interview, the patient notes that they attended a group yesterday. They note that this was beneficial. They have been trying to use distraction with their SI. They note that they were previously on 225 mg of Effexor and this worked better. They consent to increasing their medication to this dose after discussing B/R/SE.    The patient notes that they do not feel safe to leave today. They note that they continue to be depressed and are hoping for further improvement. Discussed recommendation for DBT with patient being in agreement.    The patient denies any headache, confusion, change in vision, chest pain, SOB, abdominal pain, diarrhea, or constipation. No medical concerns today.       MEDICATIONS   Scheduled Meds:    traZODone  50 mg Oral At Bedtime    Or     traZODone  100 mg Oral At Bedtime     venlafaxine  150 mg Oral Daily with breakfast     PRN Meds:.acetaminophen, albuterol, alum & mag hydroxide-simethicone, clonazePAM, hydrOXYzine, melatonin, OLANZapine **OR** OLANZapine, senna-docusate     ALLERGIES   Allergies   Allergen Reactions     Pcn [Penicillins]         MENTAL STATUS EXAM   Vitals: /85   Pulse (!) 122   Temp 97.8  F (36.6  C) (Tympanic)   Resp 18   SpO2 97%     Appearance: Alert, oriented, dressed in hospital scrubs  Attitude: Cooperative to an extent  Eye Contact: Fair  Mood: \"Depressed\"  Affect: Restricted  Speech: Normal rate and rhythm   Psychomotor Behavior: No TD or rigidity. No tremor or " akathisia.   Thought Process: Disorganized  Associations: No loose associations   Thought Content: Denies SI, plan, or SIB. Denies AVH. No evidence of delusional thought  Insight: Adequate  Judgment: Fair  Oriented to: Person, place, and time  Attention Span and Concentration: Intact  Recent and Remote Memory: Intact  Language: English with appropriate syntax and vocabulary  Fund of Knowledge: Average   Muscle Strength and Tone: Grossly normal  Gait and Station: Grossly normal       LABS   No results found for this or any previous visit (from the past 24 hour(s)).      IMPRESSION     Non-binary 32 year old patient who presents with recent worsening mood and suicidal thoughts in the context of grief. They will need a brief observation and stabilization admission. Increasing Effexor to address depression.       DIAGNOSES     1. Major depressive disorder, recurrent, severe  2. BPD  3. PTSD       PLAN     Location: Unit 5  Legal Status: Orders Placed This Encounter      Legal status Voluntary    Safety Assessment:    Behavioral Orders   Procedures     Code 1 - Restrict to Unit     Routine Programming     As clinically indicated     Status 15     Every 15 minutes.      PTA psychotropic medications continued/changed:     - Effexor  mg daily  - Trazodone 100 mg at bedtime with 50 mg prn     New medications initiated:     - Standard unit prns     Today's Changes:    - Increase Effexor to 225 mg at bedtime   - Change Trazodone to 50 mg prn sleep, may repeat x 2, matching home dosing     Programming: Patient will be treated in a therapeutic milieu with appropriate individual and group therapies. Education will be provided on diagnoses, medications, and treatments.     Medical diagnoses:  Per medicine    Consult: None  Tests: None    Anticipated LOS: 3-5 days   Disposition: Home with outpatient services. Recommend DBT       TREATMENT TEAM CARE PLAN     Progress: Continued symptoms.    Continued Stay Criteria/Rationale:  Continued symptoms without sufficient improvement/resolution.    Medical/Physical: See above.    Precautions: See above.     Plan: Continue inpatient care with unit support and medication management.    Rationale for change in precautions or plan: NA due to no change.    Participants: Veto Epstein, DO, Nursing, SW, OT.    The patient's care was discussed with the treatment team and chart notes were reviewed.       ATTESTATION      Dr. Veto Epstein  Psychiatrist    Video Visit: Patient has given verbal consent for video visit?: Yes  Type of Service: video visit for mental health treatment  Reason for Video Visit: COVID-19 [place]  Originating Site (patient location): [facility]  Distant Site (provider location): Remote Location  Mode of Communication: Video Conference via [employer]  Time of Service: Date: 12/19/2022 Star 1030 end: 1043

## 2022-12-19 NOTE — PLAN OF CARE
Problem: Adult Behavioral Health Plan of Care  Goal: Patient-Specific Goal (Individualization)  Description: Patient will sleep 6 to 8 hours per night  Patient will eat at least 50% of meals  Patient will attend at least 50% of groups  Patient will comply with recommendations of treatment team  Patient will remain medication compliant    Outcome: Progressing   Patient has been awake off and on throughout the shift.  Slept total of 4 hours.  Out to nurses station x 1 to request delores gum.  Pulse elevated at 122 this morning.  All other VS WNL.  Face to face end of shift report communicated to day shift RN.     Martina Hood RN  12/19/2022  6:22 AM

## 2022-12-19 NOTE — DISCHARGE INSTRUCTIONS
"Behavioral Discharge Planning and Instructions    Summary: Pt presented to our unit due to \"grieving\", med problems, and SI. Pt had a plan to throw self in the river.     Main Diagnosis: Severe major depression without psychotic features (H)    Health Care Follow-up:     [facility]  Therapy: Kathy Segovia- Wednesday March 15th @ 11:00 AM Via Phone  Med Management: Vince Matta- Wednesday February 1st @ 2:30 PM   [address] [place] [address] [address]  [place] [address]  Phone: [phone]    Attend all scheduled appointments with your outpatient providers. Call at least 24 hours in advance if you need to reschedule an appointment to ensure continued access to your outpatient providers.     Major Treatments, Procedures and Findings:  You were provided with: a psychiatric assessment, assessed for medical stability, medication evaluation and/or management, group therapy, family therapy, individual therapy, CD evaluation/assessment, milieu management, and medical interventions    Symptoms to Report: feeling more aggressive, increased confusion, losing more sleep, mood getting worse, or thoughts of suicide    Early warning signs can include: increased depression or anxiety sleep disturbances increased thoughts or behaviors of suicide or self-harm  increased unusual thinking, such as paranoia or hearing voices    Safety and Wellness:  Take all medicines as directed.  Make no changes unless your doctor suggests them.      Follow treatment recommendations.  Refrain from alcohol and non-prescribed drugs.  Ask your support system to help you reduce your access to items that could harm yourself or others. Items could include:  Firearms  Medicines (both prescribed and over-the-counter)  Knives and other sharp objects  Ropes and like materials  Car keys  If there is a concern for safety, call 911. If there is a concern for safety, call 911.    Resources:   Crisis Intervention: 384.261.1163 or " "150.463.6938 (TTY: 194.326.3395).  Call anytime for help.  National [place] on Mental Illness (www.[place].howard.org): 546.871.5439 or 385-824-3756.  Alcoholics Anonymous (www.alcoholics-anonymous.org): Check your phone book for your local chapter.  Suicide Awareness Voices of Education (SAVE) (www.save.org): 390-584-ZWMF (3888)  National Suicide Prevention Line (www.mentalhealthmn.org): 839-682-SMUL (2356)  Mental Health Consumer/Survivor Network of [place] (www.mhcsn.net): 655.536.2758 or 914-496-0244  Mental Health Association of [place] (www.mentalhealth.org): 463.307.7429 or 407-685-0079  Self- Management and Recovery Training., [place]-- Toll free: 248.197.5357  www.[employer]  Text 4 Life: txt \"LIFE\" to 57458 for immediate support and crisis intervention  Crisis text line: Text \"[place]\" to 861613. Free, confidential, 24/7.  Crisis Intervention: 896.408.4543 or 499-095-1332. Call anytime for help.     General Medication Instructions:   See your medication sheet(s) for instructions.   Take all medicines as directed.  Make no changes unless your doctor suggests them.   Go to all your doctor visits.  Be sure to have all your required lab tests. This way, your medicines can be refilled on time.  Do not use any drugs not prescribed by your doctor.  Avoid alcohol.    Advance Directives:   Scanned document on file with [place]? No scanned doc  Is document scanned? Pt states no documents  Honoring Choices Your Rights Handout: Informed and given  Was more information offered? Pt declined    The Treatment team has appreciated the opportunity to work with you. If you have any questions or concerns about your recent admission, you can contact the unit which can receive your call 24 hours a day, 7 days a week. They will be able to get in touch with a Provider if needed. The unit number is [phone] .  "

## 2022-12-19 NOTE — PLAN OF CARE
Problem: Adult Behavioral Health Plan of Care  Goal: Patient-Specific Goal (Individualization)  Description: Patient will sleep 6 to 8 hours per night  Patient will eat at least 50% of meals  Patient will attend at least 50% of groups  Patient will comply with recommendations of treatment team  Patient will remain medication compliant    Outcome: Progressing     Problem: Suicidal Behavior  Goal: Suicidal Behavior is Absent or Managed  Description: Pt will be free of self harm while on unit.   Outcome: Progressing     Problem: Suicide Risk  Goal: Absence of Self-Harm  Outcome: Progressing   Goal Outcome Evaluation:         Face to face end of shift report communicated to evening shift RN.     Darcy Peace RN  12/19/2022  12:27 PM

## 2022-12-19 NOTE — CARE PLAN
Prior to Admission Medication Reconciliation:     Medications added:   [] None  [x] As listed below:    All medications in PTA meds    Multi gummie    Medications deleted:   [x] None  [] As listed below:    Medications marked for review/removal by attending:  [x] None  [] As listed below:    Changes made to existing medications:   [x] None  [] Updated time of day, strengths and frequencies to most current.     Pertinent notes/medications patient takes different than prescribed:     effexor- pt was on 225 mg total, had been off for approx 1 week, then was restarted on 75 mg once per day 12/13/22.     Trazodone- reports 50 mg is usually sufficient to fall asleep. Has needed to go up to 150 mg, but has never needed more than that.     Last times/dates taken verified with patient:  [x] Yes- completed myself  [] Did not review with patient. Rx verification only. Review completed by nursing.    [] Nurse completed no changes made (double checked entries)  [] Unable to review with patient at this time:    Allergy review:    []Did not review: reviewed by nursing  [x]Allergies reviewed and updated if needed.     Medication reconciliation sources:   [x]Patient  []Patient family member/emergency contact: **  [][facility] Report Review  [x]Epic Chart Review: Tash- see 12/13/22  [x]Care Everywhere review  []Pharmacy med list/phone call: **  [x]Outside meds dispense report:   [x]Fill dates reflect compliancy. No concerns.  []Fill dates do not reflect compliancy on the following medications:   []HomeCare medlist, Nursing home or Assisted Living MAR:  []Behavioral Health Provider:  []Other: **    Pharmacy desired at discharge: [facility]    Is patient on coumadin?   [x]No  []Yes      Fill dates and reported compliancy:  [x] Patient reports some non-compliancy on all scheduled medications.   [] Not applicable. Patient is not taking any maintenance medications at this time.   [] Fill dates do not coincide with compliancy, but patient  reports compliancy.    [] Did not review with patient. Cannot assess.     Historian accuracy:  [x] Excellent- alert and oriented, understands why meds were prescribed and how to take, able to answer specifics  [] Good- alert and oriented, understands why meds were prescribed and how to take, some confusion   [] Fair- alert and oriented, doesn't know medications without list, cannot answer specifics about medications, but has a decent process for which to take at home  [] Poor- does not know medications, may not have a process to take at home, may be cognitively unable to review at this time  []Medication management done by family member or facility, no concerns about historian accuracy.   [] Did not review with patient. Cannot assess.     Medication Management:  [x] Manages meds independently  [] Family member/ other party manages meds/assists:  [] Meds managed by staff at facility  [] Meds set up by home care, family/other party helps administer  [] Meds set up by home care, self administers  [] Did not review with patient. Cannot assess.     Other medications aside from PTA:  [x] Denies taking any other medications aside from those listed in PTA meds, this includes over-the-counter vitamins, supplements and analgesics.       Sharon Gilmore on 12/19/2022 at 11:38 AM       Notifying appropriate party of changes/additions/discrepancies:  [x]No pertinent changes made, notification not necessary.   [] Notified attending provider via text page/phone call/sticky note or other:  [] Notified other:  [] Medications have not been reconciled by a provider yet, notification not necessary  [] Pt is not admitted to floor yet or patient is boarding, PTA meds completed before admission.     Medications Prior to Admission   Medication Sig Dispense Refill Last Dose     clonazePAM (KLONOPIN) 1 MG tablet Take 1 tablet by mouth 2 times daily as needed   Past Month     Multiple Vitamins-Minerals (MULTIVITAMIN GUMMIES ADULTS PO) Take 1  Dose by mouth daily (1 dose= 2 gummies)   Past Week     traZODone (DESYREL) 50 MG tablet Take 1-4 tablets by mouth nightly as needed for insomnia   Past Week     venlafaxine (EFFEXOR XR) 75 MG 24 hr capsule Take 75 mg by mouth daily before breakfast   Past Month     VENTOLIN  (90 Base) MCG/ACT inhaler Inhale 1-2 puffs into the lungs every 4 hours as needed   More than a month

## 2022-12-20 PROCEDURE — 124N000001 HC R&B MH

## 2022-12-20 PROCEDURE — 250N000013 HC RX MED GY IP 250 OP 250 PS 637: Performed by: PSYCHIATRY & NEUROLOGY

## 2022-12-20 PROCEDURE — 99232 SBSQ HOSP IP/OBS MODERATE 35: CPT | Performed by: STUDENT IN AN ORGANIZED HEALTH CARE EDUCATION/TRAINING PROGRAM

## 2022-12-20 PROCEDURE — 250N000013 HC RX MED GY IP 250 OP 250 PS 637: Performed by: STUDENT IN AN ORGANIZED HEALTH CARE EDUCATION/TRAINING PROGRAM

## 2022-12-20 RX ADMIN — VENLAFAXINE HYDROCHLORIDE 225 MG: 75 CAPSULE, EXTENDED RELEASE ORAL at 21:56

## 2022-12-20 RX ADMIN — MULTIPLE VITAMINS W/ MINERALS TAB 1 TABLET: TAB at 11:02

## 2022-12-20 RX ADMIN — CLONAZEPAM 1 MG: 1 TABLET ORAL at 00:21

## 2022-12-20 ASSESSMENT — ACTIVITIES OF DAILY LIVING (ADL)
ADLS_ACUITY_SCORE: 40
DRESS: SCRUBS (BEHAVIORAL HEALTH);INDEPENDENT
ADLS_ACUITY_SCORE: 40
LAUNDRY: UNABLE TO COMPLETE
ORAL_HYGIENE: INDEPENDENT
ADLS_ACUITY_SCORE: 40
HYGIENE/GROOMING: INDEPENDENT

## 2022-12-20 NOTE — PLAN OF CARE
Problem: Adult Behavioral Health Plan of Care  Goal: Patient-Specific Goal (Individualization)  Description: Patient will sleep 6 to 8 hours per night  Patient will eat at least 50% of meals  Patient will attend at least 50% of groups  Patient will comply with recommendations of treatment team  Patient will remain medication compliant    Outcome: Progressing     Problem: Suicidal Behavior  Goal: Suicidal Behavior is Absent or Managed  Description: Pt will be free of self harm while on unit.   Outcome: Progressing   Goal Outcome Evaluation:    Plan of Care Reviewed With: patient          Face to face end of shift report communicated to evening shift RN. Reported that pt is a risk for suicide.     Darcy Peace, RN  12/20/2022  8:13 AM

## 2022-12-20 NOTE — PROGRESS NOTES
Discharge Plan: Home with services (unknown date)  Services: psychiatry and therapy   Placement: NA  Referrals made: NA  Court status: NA  [place]/SDM: MARY

## 2022-12-20 NOTE — PLAN OF CARE
PT endorsing chronic SI, does not feel ready to leave. PT isolative and withdrawn in bed to room throughout shift.   PT denies pain.     Face to face end of shift report communicated to oncoming RN     Urvashi Bergeron RN  12/19/2022  10:34 PM                   Problem: Adult Behavioral Health Plan of Care  Goal: Patient-Specific Goal (Individualization)  Description: Patient will sleep 6 to 8 hours per night  Patient will eat at least 50% of meals  Patient will attend at least 50% of groups  Patient will comply with recommendations of treatment team  Patient will remain medication compliant    Outcome: Progressing     Problem: Suicide Risk  Goal: Absence of Self-Harm  Outcome: Progressing   Goal Outcome Evaluation:

## 2022-12-20 NOTE — PROGRESS NOTES
"[facility] PSYCHIATRY  PROGRESS NOTE     SUBJECTIVE     Prior to interviewing the patient, I met with nursing and reviewed patient's clinical condition. We discussed clinical care both before and after the interview. I have reviewed the patient's clinical course by review of records including previous notes, labs, and vital signs.     Per nursing, the patient had the following behavioral events over the last 24-hours: none. Patient is calm and cooperative.    On psychiatric interview, the patient notes that they are doing pretty well today. They went to an art group yesterday and then an emotional release group today. They also notes exercising today. They played the [place] in addition.    They note that they are feeling less depressed. They note that they have been trying to help others, which is helping. They note having some suicidal thoughts earlier today, but they have resolved.    He denies any problems with Effexor besides sedation. He would like to take it at night with it being changed to this.    He denies any physical concerns today.       MEDICATIONS   Scheduled Meds:    multivitamin w/minerals  1 tablet Oral Daily     venlafaxine  225 mg Oral At Bedtime     PRN Meds:.acetaminophen, albuterol, alum & mag hydroxide-simethicone, clonazePAM, hydrOXYzine, melatonin, OLANZapine **OR** OLANZapine, senna-docusate, traZODone     ALLERGIES   Allergies   Allergen Reactions     Cats Rash     Asthma: Trouble breathing and rash           Molds & Smuts      asthma     Amoxicillin Unknown     Family allergy     Pcn [Penicillins] Unknown     Family allergy. Pt states she was told by parents to avoid this as they are allergic       Smoke.      Factory smoke        MENTAL STATUS EXAM   Vitals: /67   Pulse 80   Temp 97.7  F (36.5  C) (Temporal)   Resp 18   SpO2 96%     Appearance: Alert, oriented, dressed in hospital scrubs  Attitude: Cooperative to an extent  Eye Contact: Fair  Mood: \"Ok\"  Affect: " Restricted  Speech: Normal rate and rhythm   Psychomotor Behavior: No TD or rigidity. No tremor or akathisia.   Thought Process: Disorganized  Associations: No loose associations   Thought Content: Denies SI, plan, or SIB. Denies AVH. No evidence of delusional thought  Insight: Adequate  Judgment: Fair  Oriented to: Person, place, and time  Attention Span and Concentration: Intact  Recent and Remote Memory: Intact  Language: English with appropriate syntax and vocabulary  Fund of Knowledge: Average   Muscle Strength and Tone: Grossly normal  Gait and Station: Grossly normal       LABS   No results found for this or any previous visit (from the past 24 hour(s)).      IMPRESSION     Non-binary 32 year old patient who presents with recent worsening mood and suicidal thoughts in the context of grief. They will need a brief observation and stabilization admission. Increasing Effexor to address depression.    Today: The patient is noting some small improvement in his depression with increase in Effexor and then also supportive environment. Plan for discharge by end of the week.       DIAGNOSES     1. Major depressive disorder, recurrent, severe  2. BPD  3. PTSD       PLAN     Location: Unit 5  Legal Status: Orders Placed This Encounter      Legal status Voluntary    Safety Assessment:    Behavioral Orders   Procedures     Code 1 - Restrict to Unit     Routine Programming     As clinically indicated     Status 15     Every 15 minutes.      PTA psychotropic medications continued/changed:     - Effexor  mg daily -> 225 mg at bedtime as of 12/19  - Trazodone 50 mg prn sleep, may repeat x 2    New medications initiated:     - Standard unit prns     Today's Changes:    None    Programming: Patient will be treated in a therapeutic milieu with appropriate individual and group therapies. Education will be provided on diagnoses, medications, and treatments.     Medical diagnoses:  Per medicine    Consult: None  Tests:  None    Anticipated LOS: 3-5 days. Anticipate discharge by Thursday or Friday at latest   Disposition: Home with outpatient services. Recommend DBT through [facility].       ATTESTATION      Dr. Veto Epstein  Psychiatrist    Video Visit: Patient has given verbal consent for video visit?: Yes  Type of Service: video visit for mental health treatment  Reason for Video Visit: COVID-19 [place]  Originating Site (patient location): [facility]  Distant Site (provider location): Remote Location  Mode of Communication: Video Conference via [employer]  Time of Service: Date: 12/20/2022 Star 530 end: 550

## 2022-12-20 NOTE — PLAN OF CARE
Problem: Adult Behavioral Health Plan of Care  Goal: Patient-Specific Goal (Individualization)  Description: Patient will sleep 6 to 8 hours per night  Patient will eat at least 50% of meals  Patient will attend at least 50% of groups  Patient will comply with recommendations of treatment team  Patient will remain medication compliant    Outcome: Progressing  Note: Report received from Rodney Rounding complete. 0021- Pt requested and was admin 1 mg klonipin for c/o anxiety stating they had a triggering moment in a dream.    Pt observed sleeping in supine position with regular and unlabored respirations.    Pt has been in bed with eyes closed and regular respirations. 15 minute and PRN checks all night. No complaints offered. Will continue to monitor.    Pt slept approx 5   hours this NOC shift.    Face to face end of shift report communicated to oncoming RN.    Chelsea NIXON RN  December 20, 2022  5:58 AM          Problem: Suicidal Behavior  Goal: Suicidal Behavior is Absent or Managed  Description: Pt will be free of self harm while on unit.   Outcome: Progressing  Note: Unable to assess due to pt sleeping. Pt has remained free of self-harm.       Problem: Suicide Risk  Goal: Absence of Self-Harm  Outcome: Progressing  Note: Unable to assess due to pt sleeping. Pt has remained free of self-harm.     Goal Outcome Evaluation:

## 2022-12-21 PROCEDURE — 250N000013 HC RX MED GY IP 250 OP 250 PS 637: Performed by: PSYCHIATRY & NEUROLOGY

## 2022-12-21 PROCEDURE — 250N000013 HC RX MED GY IP 250 OP 250 PS 637: Performed by: STUDENT IN AN ORGANIZED HEALTH CARE EDUCATION/TRAINING PROGRAM

## 2022-12-21 PROCEDURE — 99232 SBSQ HOSP IP/OBS MODERATE 35: CPT | Performed by: NURSE PRACTITIONER

## 2022-12-21 PROCEDURE — 124N000001 HC R&B MH

## 2022-12-21 RX ADMIN — TRAZODONE HYDROCHLORIDE 50 MG: 50 TABLET ORAL at 20:24

## 2022-12-21 RX ADMIN — CLONAZEPAM 1 MG: 1 TABLET ORAL at 14:45

## 2022-12-21 RX ADMIN — VENLAFAXINE HYDROCHLORIDE 225 MG: 75 CAPSULE, EXTENDED RELEASE ORAL at 20:22

## 2022-12-21 RX ADMIN — MULTIPLE VITAMINS W/ MINERALS TAB 1 TABLET: TAB at 20:22

## 2022-12-21 ASSESSMENT — ACTIVITIES OF DAILY LIVING (ADL)
ADLS_ACUITY_SCORE: 40
HYGIENE/GROOMING: INDEPENDENT
ORAL_HYGIENE: INDEPENDENT
ADLS_ACUITY_SCORE: 40
ADLS_ACUITY_SCORE: 40
DRESS: SCRUBS (BEHAVIORAL HEALTH);INDEPENDENT
LAUNDRY: UNABLE TO COMPLETE
ADLS_ACUITY_SCORE: 40

## 2022-12-21 NOTE — PLAN OF CARE
Scheduled follow ups with     [facility]  Therapy: Kathy Segovia- Wednesday March 15th @ 11:00 AM Via Phone  Med Management: Vince Matta- Wednesday February 1st @ 2:30 PM    Discharge Plan: Home with services (Thursday or Friday)  Services: psychiatry and therapy   Placement: NA  Referrals made: MARY  Follow [facility]ps: [facility]  Therapy: Kathy Segovia- Wednesday March 15th @ 11:00 AM Via Phone  Med Management: Vince Matta- Wednesday February 1st @ 2:30 PM   Court status: MARY Botello/SDM: MARY

## 2022-12-21 NOTE — PLAN OF CARE
Problem: Adult Behavioral Health Plan of Care  Goal: Patient-Specific Goal (Individualization)  Description: Patient will sleep 6 to 8 hours per night  Patient will eat at least 50% of meals  Patient will attend at least 50% of groups  Patient will comply with recommendations of treatment team  Patient will remain medication compliant    Outcome: Progressing  Note: Report received from Urvashi. Rounding complete. Pt observed sleeping in supine position with regular and unlabored respirations.    Pt up and about on the unit and had a snack from [id number] and then back to bed    Pt has been in bed with eyes closed and regular respirations. 15 minute and PRN checks all night. No complaints offered. Will continue to monitor.    Pt slept approx  2.75  hours this NOC shift.    Face to face end of shift report communicated to oncoming RN.    Chelesa NIXON RN  December 21, 2022  3:23 AM          Problem: Suicidal Behavior  Goal: Suicidal Behavior is Absent or Managed  Description: Pt will be free of self harm while on unit.   Outcome: Progressing  Note:   No issues noted. Pt has remained free of injury and self harm     Problem: Suicide Risk  Goal: Absence of Self-Harm  Outcome: Progressing  Note: No issues noted. Pt has remained free of injury and self harm   Goal Outcome Evaluation:

## 2022-12-21 NOTE — PLAN OF CARE
SHIFT SUMMARY:  Denies suicidal ideation, pain and hallucinations. Isolative. Flat affect. Eating at least 50% of meals. Did not attend group tonight. Compliant with treatment team recommendations and medication administration.       Problem: Adult Behavioral Health Plan of Care  Goal: Patient-Specific Goal (Individualization)  Description: Patient will sleep 6 to 8 hours per night  Patient will eat at least 50% of meals  Patient will attend at least 50% of groups  Patient will comply with recommendations of treatment team  Patient will remain medication compliant    Outcome: Progressing     Problem: Suicidal Behavior  Goal: Suicidal Behavior is Absent or Managed  Description: Pt will be free of self harm while on unit.   Outcome: Progressing     Problem: Suicide Risk  Goal: Absence of Self-Harm  Outcome: Progressing   Goal Outcome Evaluation:

## 2022-12-21 NOTE — PLAN OF CARE
Problem: Adult Behavioral Health Plan of Care  Goal: Patient-Specific Goal (Individualization)  Description: Patient will sleep 6 to 8 hours per night  Patient will eat at least 50% of meals  Patient will attend at least 50% of groups  Patient will comply with recommendations of treatment team  Patient will remain medication compliant    Outcome: Progressing   Pt up at the start of the shift. Pt commented to nursing that changing the effexor to night was a good idea because she doesn't feel sedated this morning. Pt attended group this morning and socialized with peers.   Pt denies pain, SI, HI and halucinations,. Pt did report anxiety ay 8/10 and depression. Pt states that she is still having SI but not at this time. Klonopin 1mg given at 1445.        Problem: Suicidal Behavior  Goal: Suicidal Behavior is Absent or Managed  Description: Pt will be free of self harm while on unit.   Outcome: Progressing     Problem: Suicide Risk  Goal: Absence of Self-Harm  Outcome: Progressing   Goal Outcome Evaluation:         Face to face end of shift report communicated to evening shift RN. Reported that pt is a risk for SI.     Darcy Peace RN  12/21/2022  1:10 PM

## 2022-12-21 NOTE — PROGRESS NOTES
"[facility] PSYCHIATRY  PROGRESS NOTE     SUBJECTIVE     Prior to interviewing the patient, I met with nursing and reviewed patient's clinical condition. We discussed clinical care both before and after the interview. I have reviewed the patient's clinical course by review of records including previous notes, labs, and vital signs.     Per nursing, the patient had the following behavioral events over the last 24-hours: none. Patient is calm and cooperative.    Patient is walking the cuevas, agrees to meet in his room.  They report mood \"is about the same\" and reports having some suicidal thought earlier today, denies thought lingered more than a few minutes and no longer having them.  Patient reports sleeping ok, is socially appropriate and attends most group programming.  We discuss discharge plan, possibly by week's end as patient brings up wanting to put in some work hours over the holidays.       MEDICATIONS   Scheduled Meds:    multivitamin w/minerals  1 tablet Oral Daily     venlafaxine  225 mg Oral At Bedtime     PRN Meds:.acetaminophen, albuterol, alum & mag hydroxide-simethicone, clonazePAM, hydrOXYzine, melatonin, OLANZapine **OR** OLANZapine, senna-docusate, traZODone     ALLERGIES   Allergies   Allergen Reactions     Cats Rash     Asthma: Trouble breathing and rash           Molds & Smuts      asthma     Amoxicillin Unknown     Family allergy     Pcn [Penicillins] Unknown     Family allergy. Pt states she was told by parents to avoid this as they are allergic       Smoke.      Factory smoke        MENTAL STATUS EXAM   Vitals: /71   Pulse 72   Temp 97  F (36.1  C) (Temporal)   Resp 14   SpO2 97%     Appearance: Alert, oriented, dressed in hospital scrubs  Attitude: Cooperative to an extent  Eye Contact: Fair  Mood: \"same\"  Affect: Full Range  Speech: Normal rate and rhythm   Psychomotor Behavior: No TD or rigidity. No tremor or akathisia.   Thought Process: denies racing thought  Associations: " No loose associations   Thought Content: Denies SI, plan, or SIB. Denies AVH. No evidence of delusional thought  Insight: Adequate  Judgment: Fair  Oriented to: Person, place, and time  Attention Span and Concentration: Intact  Recent and Remote Memory: Intact  Language: English with appropriate syntax and vocabulary  Fund of Knowledge: Average   Muscle Strength and Tone: Grossly normal  Gait and Station: Grossly normal       LABS   No results found for this or any previous visit (from the past 24 hour(s)).      IMPRESSION     Non-binary 32 year old patient who presents with recent worsening mood and suicidal thoughts in the context of grief. They will need a brief observation and stabilization admission. Increasing Effexor to address depression.    Today: Patient showing some improvement with Effexor and then also supportive environment. Plan for discharge by end of the week.       DIAGNOSES     1. Major depressive disorder, recurrent, severe  2. BPD  3. PTSD       PLAN     Location: Unit 5  Legal Status: Orders Placed This Encounter      Legal status Voluntary    Safety Assessment:    Behavioral Orders   Procedures     Code 1 - Restrict to Unit     Routine Programming     As clinically indicated     Status 15     Every 15 minutes.      PTA psychotropic medications continued/changed:     - Effexor  mg daily -> 225 mg at bedtime as of 12/19  - Trazodone 50 mg prn sleep, may repeat x 2    New medications initiated:     - Standard unit prns     Today's Changes:    None    Programming: Patient will be treated in a therapeutic milieu with appropriate individual and group therapies. Education will be provided on diagnoses, medications, and treatments.     Medical diagnoses:  Per medicine    Consult: None  Tests: None    Anticipated LOS: 3-5 days. Anticipate discharge by Thursday or Friday at latest   Disposition: Home with outpatient services. Recommend DBT through [facility].       ATTESTATION      Rose Guo,  CNP

## 2022-12-21 NOTE — PLAN OF CARE
Pt more alert today, attending group, social with other patients.   PT endorses continued chronic SI but reports that they did not think about it as much today. We talked a lot of superficial talk today about science and string theory and they gave me a list of books to read. PT appeared to have more of a full range affect but flat at times. PT out of room more this shift. PT started effexor this evening.       Face to face end of shift report communicated to oncoming RN    Urvashi Bergeron RN  12/20/2022  10:41 PM       Problem: Adult Behavioral Health Plan of Care  Goal: Patient-Specific Goal (Individualization)  Description: Patient will sleep 6 to 8 hours per night  Patient will eat at least 50% of meals  Patient will attend at least 50% of groups  Patient will comply with recommendations of treatment team  Patient will remain medication compliant    Outcome: Progressing     Problem: Suicidal Behavior  Goal: Suicidal Behavior is Absent or Managed  Description: Pt will be free of self harm while on unit.   Outcome: Progressing     Problem: Suicide Risk  Goal: Absence of Self-Harm  Outcome: Progressing   Goal Outcome Evaluation:    Plan of Care Reviewed With: patient

## 2022-12-22 PROCEDURE — 250N000013 HC RX MED GY IP 250 OP 250 PS 637: Performed by: STUDENT IN AN ORGANIZED HEALTH CARE EDUCATION/TRAINING PROGRAM

## 2022-12-22 PROCEDURE — 124N000001 HC R&B MH

## 2022-12-22 PROCEDURE — 99232 SBSQ HOSP IP/OBS MODERATE 35: CPT | Performed by: NURSE PRACTITIONER

## 2022-12-22 PROCEDURE — 250N000013 HC RX MED GY IP 250 OP 250 PS 637: Performed by: PSYCHIATRY & NEUROLOGY

## 2022-12-22 RX ADMIN — MULTIPLE VITAMINS W/ MINERALS TAB 1 TABLET: TAB at 21:55

## 2022-12-22 RX ADMIN — VENLAFAXINE HYDROCHLORIDE 225 MG: 75 CAPSULE, EXTENDED RELEASE ORAL at 21:55

## 2022-12-22 RX ADMIN — CLONAZEPAM 1 MG: 1 TABLET ORAL at 00:54

## 2022-12-22 ASSESSMENT — ACTIVITIES OF DAILY LIVING (ADL)
LAUNDRY: UNABLE TO COMPLETE
DRESS: SCRUBS (BEHAVIORAL HEALTH)
ADLS_ACUITY_SCORE: 40
HYGIENE/GROOMING: INDEPENDENT
HYGIENE/GROOMING: INDEPENDENT
ORAL_HYGIENE: INDEPENDENT
LAUNDRY: UNABLE TO COMPLETE
ORAL_HYGIENE: INDEPENDENT
ADLS_ACUITY_SCORE: 40
DRESS: SCRUBS (BEHAVIORAL HEALTH);INDEPENDENT
ADLS_ACUITY_SCORE: 40

## 2022-12-22 NOTE — PLAN OF CARE
Problem: Adult Behavioral Health Plan of Care  Goal: Plan of Care Review  Outcome: Progressing  Note: Report received from Constantin. Zeferino complete. Pt observed awake in bed and appears preoccupied and anxious. 0054- Pt offered and accepted clonazepam 1 mg PRN PO for anxiety.     Pt has been in bed with eyes closed and regular respirations. 15 minute and PRN checks all night. No complaints offered. Will continue to monitor.    Pt slept approx  4  hours this NOC shift.    Face to face end of shift report communicated to oncoming RN.    Chelsea NIXON RN  December 22, 2022  1:12 AM          Problem: Suicidal Behavior  Goal: Suicidal Behavior is Absent or Managed  Description: Pt will be free of self harm while on unit.   Outcome: Progressing  Note: No issues or concerns noted at this time     Problem: Suicide Risk  Goal: Absence of Self-Harm  Outcome: Progressing   Goal Outcome Evaluation:

## 2022-12-22 NOTE — PLAN OF CARE
Spoke to provider about possible discharge tomorrow - checked with [employer] and they are able to transport the patient to [place] tomorrow if needed - passed on to nursing staff and they will review with the patient.

## 2022-12-22 NOTE — PLAN OF CARE
Problem: Suicidal Behavior  Goal: Suicidal Behavior is Absent or Managed  Description: Pt will be free of self harm while on unit.   Outcome: Progressing     Problem: Adult Behavioral Health Plan of Care  Goal: Patient-Specific Goal (Individualization)  Description: Patient will sleep 6 to 8 hours per night  Patient will eat at least 50% of meals  Patient will attend at least 50% of groups  Patient will comply with recommendations of treatment team  Patient will remain medication compliant    Outcome: Progressing   Patient has been calm, cooperative, and medication complaint this shift.  Isolative and withdrawn to room much of the morning but did [facility] more after lunch.  Polite and friendly with staff.  Flat blunted affect but denies suicidal thoughts at this time.  No complaints of pain.  VS WNL.  Face to face end of shift report communicated to evening shift RN.     Martina Hood RN  12/22/2022  2:14 PM

## 2022-12-22 NOTE — PROGRESS NOTES
"[facility] PSYCHIATRY  PROGRESS NOTE     SUBJECTIVE     Prior to interviewing the patient, I met with nursing and reviewed patient's clinical condition. We discussed clinical care both before and after the interview. I have reviewed the patient's clinical course by review of records including previous notes, labs, and vital signs.     Per nursing, the patient had the following behavioral events over the last 24-hours: none. Patient is calm and cooperative.    Patient is resting in bed this morning.  He reports not sleeping well last night \"a lot going on around here\", adds it was busy and noisy.  He reports having suicidal thoughts earlier, denies any current thoughts although he just woke up from a deep sleep.  We discuss discharge plans, patient is not feeling ready today and would like to make sure he is stable on current medications, will continue to monitor and plan for Friday discharge as he needs to go to [place] and this is Holiday weekend, patient would like to work.       MEDICATIONS   Scheduled Meds:    multivitamin w/minerals  1 tablet Oral Daily     venlafaxine  225 mg Oral At Bedtime     PRN Meds:.acetaminophen, albuterol, alum & mag hydroxide-simethicone, clonazePAM, hydrOXYzine, melatonin, OLANZapine **OR** OLANZapine, senna-docusate, traZODone     ALLERGIES   Allergies   Allergen Reactions     Cats Rash     Asthma: Trouble breathing and rash           Molds & Smuts      asthma     Amoxicillin Unknown     Family allergy     Pcn [Penicillins] Unknown     Family allergy. Pt states she was told by parents to avoid this as they are allergic       Smoke.      Factory smoke        MENTAL STATUS EXAM   Vitals: /51   Pulse 89   Temp 98  F (36.7  C) (Temporal)   Resp 16   SpO2 96%     Appearance: Alert, oriented, dressed in hospital scrubs  Attitude: Cooperative to an extent  Eye Contact: Fair  Mood: \"tired\"  Affect: Full Range  Speech: Normal rate and rhythm   Psychomotor Behavior: No TD or " rigidity. No tremor or akathisia.   Thought Process: denies racing thought  Associations: No loose associations   Thought Content: Denies SI, plan, or SIB. Denies AVH. No evidence of delusional thought  Insight: Adequate  Judgment: Fair  Oriented to: Person, place, and time  Attention Span and Concentration: Intact  Recent and Remote Memory: Intact  Language: English with appropriate syntax and vocabulary  Fund of Knowledge: Average   Muscle Strength and Tone: Grossly normal  Gait and Station: Grossly normal       LABS   No results found for this or any previous visit (from the past 24 hour(s)).      IMPRESSION     Non-binary 32 year old patient who presents with recent worsening mood and suicidal thoughts in the context of grief. They will need a brief observation and stabilization admission. Increasing Effexor to address depression.    Today: Patient showing some improvement with Effexor and then also supportive environment. Plan for discharge by end of the week.       DIAGNOSES     1. Major depressive disorder, recurrent, severe  2. BPD  3. PTSD       PLAN     Location: Unit 5  Legal Status: Orders Placed This Encounter      Legal status Voluntary    Safety Assessment:    Behavioral Orders   Procedures     Code 1 - Restrict to Unit     Routine Programming     As clinically indicated     Status 15     Every 15 minutes.      PTA psychotropic medications continued/changed:     - Effexor  mg daily -> 225 mg at bedtime as of 12/19  - Trazodone 50 mg prn sleep, may repeat x 2    New medications initiated:     - Standard unit prns     Today's Changes:    None    Programming: Patient will be treated in a therapeutic milieu with appropriate individual and group therapies. Education will be provided on diagnoses, medications, and treatments.     Medical diagnoses:  Per medicine    Consult: None  Tests: None    Anticipated LOS: 3-5 days. Anticipate discharge by Thursday or Friday at latest   Disposition: Home with  outpatient services. Recommend DBT through [facility].       ATTESTATION      Rose Guo, CNP

## 2022-12-23 PROCEDURE — 99232 SBSQ HOSP IP/OBS MODERATE 35: CPT | Performed by: NURSE PRACTITIONER

## 2022-12-23 PROCEDURE — 250N000013 HC RX MED GY IP 250 OP 250 PS 637: Performed by: PSYCHIATRY & NEUROLOGY

## 2022-12-23 PROCEDURE — 124N000001 HC R&B MH

## 2022-12-23 PROCEDURE — 250N000013 HC RX MED GY IP 250 OP 250 PS 637: Performed by: STUDENT IN AN ORGANIZED HEALTH CARE EDUCATION/TRAINING PROGRAM

## 2022-12-23 RX ADMIN — CLONAZEPAM 1 MG: 1 TABLET ORAL at 01:50

## 2022-12-23 RX ADMIN — MULTIPLE VITAMINS W/ MINERALS TAB 1 TABLET: TAB at 22:18

## 2022-12-23 RX ADMIN — VENLAFAXINE HYDROCHLORIDE 225 MG: 75 CAPSULE, EXTENDED RELEASE ORAL at 22:18

## 2022-12-23 RX ADMIN — TRAZODONE HYDROCHLORIDE 50 MG: 50 TABLET ORAL at 22:18

## 2022-12-23 ASSESSMENT — ACTIVITIES OF DAILY LIVING (ADL)
ADLS_ACUITY_SCORE: 40
DRESS: SCRUBS (BEHAVIORAL HEALTH)
ADLS_ACUITY_SCORE: 40
ADLS_ACUITY_SCORE: 40
HYGIENE/GROOMING: INDEPENDENT
LAUNDRY: UNABLE TO COMPLETE
ADLS_ACUITY_SCORE: 40
ORAL_HYGIENE: INDEPENDENT

## 2022-12-23 NOTE — PLAN OF CARE
Problem: Suicidal Behavior  Goal: Suicidal Behavior is Absent or Managed  Description: Pt will be free of self harm while on unit.   Outcome: Progressing     Problem: Adult Behavioral Health Plan of Care  Goal: Patient-Specific Goal (Individualization)  Description: Patient will sleep 6 to 8 hours per night  Patient will eat at least 50% of meals  Patient will attend at least 50% of groups  Patient will comply with recommendations of treatment team  Patient will remain medication compliant    Outcome: Progressing   Patient has been calm and cooperative.  No scheduled or PRN medications given this shift.  They were isolative to there room in the morning but came out to the lounge more after lunch and attended groups.  States they are still having suicidal thoughts and do not feel ready to discharge home yet.  Plan to discharge home early next week.  Affect is blunted and flat.  No complaints of pain.  VS WNL. Face to face end of shift report communicated to evening shift RN.     Martina Hood RN  12/23/2022  12:13 PM

## 2022-12-23 NOTE — PLAN OF CARE
"SHIFT SUMMARY:  Experiences occasional SI - \"I would drown myself in a river, but obviously there's none here.\" Contracts for safety. No intent, or plan. Denies pain and hallucinations. Attended group for approximately 15 minutes. Ate at least 50% of meal. Compliant with medication administration and treatment team recommendations. Free of self-harm.       Problem: Adult Behavioral Health Plan of Care  Goal: Patient-Specific Goal (Individualization)  Description: Patient will sleep 6 to 8 hours per night  Patient will eat at least 50% of meals  Patient will attend at least 50% of groups  Patient will comply with recommendations of treatment team  Patient will remain medication compliant    Outcome: Progressing     Problem: Suicidal Behavior  Goal: Suicidal Behavior is Absent or Managed  Description: Pt will be free of self harm while on unit.   Outcome: Progressing     Problem: Suicide Risk  Goal: Absence of Self-Harm  Outcome: Progressing   Goal Outcome Evaluation:    Plan of Care Reviewed With: patient                   "

## 2022-12-23 NOTE — PLAN OF CARE
Problem: Adult Behavioral Health Plan of Care  Goal: Patient-Specific Goal (Individualization)  Description: Patient will sleep 6 to 8 hours per night  Patient will eat at least 50% of meals  Patient will attend at least 50% of groups  Patient will comply with recommendations of treatment team  Patient will remain medication compliant    Outcome: Progressing  Note: Report received from Constantin. Rounding complete. Pt observed sleeping in supine position with regular and unlabored respirations.    0150- Pt requested and was admin Klonopin 1 mg for c/o 8/10 anxiety.    Pt has been in bed with eyes closed and regular respirations. 15 minute and PRN checks all night. No complaints offered. Will continue to monitor.    Pt slept approx  4.75  hours this NOC shift.    Face to face end of shift report communicated to oncoming RN.    Chelsea NIXON RN  December 23, 2022  4:47 AM          Problem: Suicidal Behavior  Goal: Suicidal Behavior is Absent or Managed  Description: Pt will be free of self harm while on unit.   Outcome: Progressing  Note: Pt did not endorse SI tonight and has remained free of injury and self harm     Problem: Suicide Risk  Goal: Absence of Self-Harm  Outcome: Progressing   Goal Outcome Evaluation:

## 2022-12-23 NOTE — PLAN OF CARE
SHIFT SUMMARY:  Calm and cooperative. Guarded, using humor. Attended group. Sociable with peers and staff. Ate at least 50% of supper. Compliant with treatment team recommendation and medications. Free of self-harm.      Problem: Adult Behavioral Health Plan of Care  Goal: Patient-Specific Goal (Individualization)  Description: Patient will sleep 6 to 8 hours per night  Patient will eat at least 50% of meals  Patient will attend at least 50% of groups  Patient will comply with recommendations of treatment team  Patient will remain medication compliant    Outcome: Progressing     Problem: Suicidal Behavior  Goal: Suicidal Behavior is Absent or Managed  Description: Pt will be free of self harm while on unit.   Outcome: Progressing     Problem: Suicide Risk  Goal: Absence of Self-Harm  Outcome: Progressing   Goal Outcome Evaluation:

## 2022-12-23 NOTE — PLAN OF CARE
Provider asked about setting up a ride for the patient for today for discharge - potential ride for 9:00 a.m. this morning - checked with patient around 7:30 - they state they are not feeling ready to discharge today so does not want to secure the ride.  Notified nursing.

## 2022-12-23 NOTE — PROGRESS NOTES
"[facility] PSYCHIATRY  PROGRESS NOTE     SUBJECTIVE     Prior to interviewing the patient, I met with nursing and reviewed patient's clinical condition. We discussed clinical care both before and after the interview. I have reviewed the patient's clinical course by review of records including previous notes, labs, and vital signs.     Per nursing, the patient had the following behavioral events over the last 24-hours: none. Patient is calm and cooperative.    Patient is sleeping this morning and reports not sleeping well last night due to noise on the unit.  He endorses having suicidal thoughts \"on and off\" and is tired today, not ready for discharge as we talked about yesterday.  Patient reports his mood has been \"up and down\" and is hoping to feel better soon as he was hoping to work over the weekend.  Will continue to monitor, patient encouraged to get plenty of sleep tonight, utilize PRNs if needed.     MEDICATIONS   Scheduled Meds:    multivitamin w/minerals  1 tablet Oral Daily     venlafaxine  225 mg Oral At Bedtime     PRN Meds:.acetaminophen, albuterol, alum & mag hydroxide-simethicone, clonazePAM, hydrOXYzine, melatonin, OLANZapine **OR** OLANZapine, senna-docusate, traZODone     ALLERGIES   Allergies   Allergen Reactions     Cats Rash     Asthma: Trouble breathing and rash           Molds & Smuts      asthma     Amoxicillin Unknown     Family allergy     Pcn [Penicillins] Unknown     Family allergy. Pt states she was told by parents to avoid this as they are allergic       Smoke.      Factory smoke        MENTAL STATUS EXAM   Vitals: BP 94/54   Pulse 74   Temp 98  F (36.7  C) (Temporal)   Resp 16   SpO2 97%     Appearance: Alert, oriented, dressed in hospital scrubs  Attitude: Cooperative to an extent  Eye Contact: Fair  Mood: \"tired and depressed again\"  Affect: Full Range  Speech: Normal rate and rhythm   Psychomotor Behavior: No TD or rigidity. No tremor or akathisia.   Thought Process: denies " "racing thought  Associations: No loose associations   Thought Content: Endorses suicidal thoughts, Denies AVH. No evidence of delusional thought  Insight: Adequate  Judgment: Fair  Oriented to: Person, place, and time  Attention Span and Concentration: Intact  Recent and Remote Memory: Intact  Language: English with appropriate syntax and vocabulary  Fund of Knowledge: Average   Muscle Strength and Tone: Grossly normal  Gait and Station: Grossly normal       LABS   No results found for this or any previous visit (from the past 24 hour(s)).      IMPRESSION     Non-binary 32 year old patient who presents with recent worsening mood and suicidal thoughts in the context of grief. They will need a brief observation and stabilization admission. Increasing Effexor to address depression.    Today: Patient did plan to discharge today, however he is experiencing suicidal thoughts frequently and mood is \"up and down\".  He would likely not be safe discharging today.       DIAGNOSES     1. Major depressive disorder, recurrent, severe without psychosis  2. BPD  3. PTSD       PLAN     Location: Unit   Legal Status: Orders Placed This Encounter      Legal status Voluntary    Safety Assessment:    Behavioral Orders   Procedures     Code 1 - Restrict to Unit     Routine Programming     As clinically indicated     Status 15     Every 15 minutes.      PTA psychotropic medications continued/changed:     - Effexor  mg daily -> 225 mg at bedtime as of 12/19  - Trazodone 50 mg prn sleep, may repeat x 2    New medications initiated:     - Standard unit prns     Today's Changes:    None    Programming: Patient will be treated in a therapeutic milieu with appropriate individual and group therapies. Education will be provided on diagnoses, medications, and treatments.     Medical diagnoses:  Per medicine    Consult: None  Tests: None    Anticipated LOS: 1-3 more days    Disposition: Home with outpatient services. Recommend DBT through " [facility].       ATTESTATION      Rose Guo, CNP

## 2022-12-24 PROCEDURE — 250N000013 HC RX MED GY IP 250 OP 250 PS 637: Performed by: NURSE PRACTITIONER

## 2022-12-24 PROCEDURE — 250N000013 HC RX MED GY IP 250 OP 250 PS 637: Performed by: PSYCHIATRY & NEUROLOGY

## 2022-12-24 PROCEDURE — 124N000001 HC R&B MH

## 2022-12-24 PROCEDURE — 99232 SBSQ HOSP IP/OBS MODERATE 35: CPT | Performed by: NURSE PRACTITIONER

## 2022-12-24 PROCEDURE — 250N000013 HC RX MED GY IP 250 OP 250 PS 637: Performed by: STUDENT IN AN ORGANIZED HEALTH CARE EDUCATION/TRAINING PROGRAM

## 2022-12-24 RX ORDER — MULTIPLE VITAMINS W/ MINERALS TAB 9MG-400MCG
1 TAB ORAL DAILY
Status: DISCONTINUED | OUTPATIENT
Start: 2022-12-24 | End: 2022-12-28 | Stop reason: HOSPADM

## 2022-12-24 RX ADMIN — CLONAZEPAM 1 MG: 1 TABLET ORAL at 03:18

## 2022-12-24 RX ADMIN — TRAZODONE HYDROCHLORIDE 50 MG: 50 TABLET ORAL at 21:53

## 2022-12-24 RX ADMIN — MULTIPLE VITAMINS W/ MINERALS TAB 1 TABLET: TAB at 16:59

## 2022-12-24 RX ADMIN — VENLAFAXINE HYDROCHLORIDE 225 MG: 75 CAPSULE, EXTENDED RELEASE ORAL at 21:53

## 2022-12-24 RX ADMIN — HYDROXYZINE HYDROCHLORIDE 25 MG: 25 TABLET, FILM COATED ORAL at 03:18

## 2022-12-24 ASSESSMENT — ACTIVITIES OF DAILY LIVING (ADL)
ADLS_ACUITY_SCORE: 40
HYGIENE/GROOMING: INDEPENDENT
ADLS_ACUITY_SCORE: 40
LAUNDRY: UNABLE TO COMPLETE
ADLS_ACUITY_SCORE: 40
ORAL_HYGIENE: INDEPENDENT
ADLS_ACUITY_SCORE: 40
DRESS: SCRUBS (BEHAVIORAL HEALTH);INDEPENDENT
ADLS_ACUITY_SCORE: 40

## 2022-12-24 NOTE — PROGRESS NOTES
"[facility] PSYCHIATRY  PROGRESS NOTE     SUBJECTIVE     Prior to interviewing the patient, I met with nursing and reviewed patient's clinical condition. We discussed clinical care both before and after the interview. I have reviewed the patient's clinical course by review of records including previous notes, labs, and vital signs.     Per nursing, the patient had the following behavioral events over the last 24-hours: Staff found vape pen in patient's room    Patient resting in bed.  They report continued suicidal thoughts with \"up and down\" mood.  They deny homicidal thought or hallucination.  Patient is disappointed in not being able to work over the weekend and did not feel stable enough for discharge.  Ask patient if he has plan to hurt himself if discharged, he states \"I feel like if I left now I would kill myself\".  We explore medication changes, additions, patient reports wanting to stabilize on recent change in venlafaxine.       MEDICATIONS   Scheduled Meds:    multivitamin w/minerals  1 tablet Oral Daily     venlafaxine  225 mg Oral At Bedtime     PRN Meds:.acetaminophen, albuterol, alum & mag hydroxide-simethicone, clonazePAM, hydrOXYzine, melatonin, OLANZapine **OR** OLANZapine, senna-docusate, traZODone     ALLERGIES   Allergies   Allergen Reactions     Cats Rash     Asthma: Trouble breathing and rash           Molds & Smuts      asthma     Amoxicillin Unknown     Family allergy     Pcn [Penicillins] Unknown     Family allergy. Pt states she was told by parents to avoid this as they are allergic       Smoke.      Factory smoke        MENTAL STATUS EXAM   Vitals: /72   Pulse 73   Temp 97.2  F (36.2  C) (Tympanic)   Resp 16   SpO2 96%     Appearance: Alert, oriented, dressed in hospital scrubs  Attitude: Cooperative   Eye Contact: Fair  Mood: \"up and down\"  Affect: Full Range  Speech: Normal rate and rhythm   Psychomotor Behavior: No TD or rigidity. No tremor or akathisia.   Thought Process: " goal directed  Associations: No loose associations   Thought Content: Endorses suicidal thoughts, Denies AVH. No evidence of delusional thought  Insight: Adequate  Judgment: Fair  Oriented to: Person, place, and time  Attention Span and Concentration: Intact  Recent and Remote Memory: Intact  Language: English with appropriate syntax and vocabulary  Fund of Knowledge: Average   Muscle Strength and Tone: Grossly normal  Gait and Station: Grossly normal       LABS   No results found for this or any previous visit (from the past 24 hour(s)).      IMPRESSION     Non-binary 32 year old patient who presents with recent worsening mood and suicidal thoughts in the context of grief. They will need a brief observation and stabilization admission. Increasing Effexor to address depression.    Today: Patient continues with depressed mood and transient suicidal thoughts, is hoping to discharge early this week.       DIAGNOSES     1. Major depressive disorder, recurrent, severe without psychosis  2. BPD  3. PTSD       PLAN     Location: Unit 5  Legal Status: Orders Placed This Encounter      Legal status Voluntary    Safety Assessment:    Behavioral Orders   Procedures     Code 1 - Restrict to Unit     Routine Programming     As clinically indicated     Status 15     Every 15 minutes.      PTA psychotropic medications continued/changed:     - Effexor  mg daily -> 225 mg at bedtime as of 12/19  - Trazodone 50 mg prn sleep, may repeat x 2    New medications initiated:     - Standard unit prns     Today's Changes:    None - may consider another increase in Effexor or adding Abilify    Programming: Patient will be treated in a therapeutic milieu with appropriate individual and group therapies. Education will be provided on diagnoses, medications, and treatments.     Medical diagnoses:  Per medicine    Consult: None  Tests: None    Anticipated LOS: 1-3 more days    Disposition: Home with outpatient services. Recommend DBT through  [facility].       ATTESTATION      Rose Guo, CNP

## 2022-12-24 NOTE — PROGRESS NOTES
12/24/22 0751   Patient Belongings   Did you bring any home meds/supplements to the hospital?  Yes   Disposition of meds  Sent to security/pharmacy per site process   Patient Belongings locker;sent to security per site process   Belongings Search Yes   Clothing Search Yes   Second Staff .   [place], [employer] Card, [employer] Gift Card, Misc. Papers, [place] Passport     All other belongings put in assigned cubby in belongings room.     PER Gloria DEWEY LPN:  This Pt came in with many items not safe or contraband for security, although inappropriate for unit & these items are marked as contraband in Pt's personal belongings    12/24/22 - Vape found in Pt's room and put in belongings.    I have reviewed my belongings list on admission and verify that it is correct.     Patient signature_______________________________    Second staff witness (if patient unable to sign) ______________________________       I have received all my belongings at discharge.    Patient signature________________________________    RACHELE  12/24/2022  7:52 AM

## 2022-12-24 NOTE — PLAN OF CARE
Problem: Adult Behavioral Health Plan of Care  Goal: Patient-Specific Goal (Individualization)  Description: Patient will sleep 6 to 8 hours per night  Patient will eat at least 50% of meals  Patient will attend at least 50% of groups  Patient will comply with recommendations of treatment team  Patient will remain medication compliant    Outcome: Progressing  Note: Report received from Constantin. Rounding complete. Pt observed awake and to the lounge a few times. Pt offered but declined PRN for sleep at this time and stated they would try to fall asleep on their own first. As of 0300 pt is still awake and declining need for prn at this time.     0318- Pt requested and was admin 25 mg atarax and 1 mg klonopin for c/o 7/10 anxiety and not being able to sleep.     Pt has been in bed with eyes closed and regular respirations. 15 minute and PRN checks all night. No complaints offered. Will continue to monitor.    Pt slept approx  4.75  hours this NOC shift.    Face to face end of shift report communicated to oncoming RN.    Chelsea NIXON RN  December 24, 2022  3:07 AM          Problem: Suicidal Behavior  Goal: Suicidal Behavior is Absent or Managed  Description: Pt will be free of self harm while on unit.   Outcome: Progressing  Note: Pt has remained free of injury and self harm      Problem: Suicide Risk  Goal: Absence of Self-Harm  Outcome: Progressing  Note: Pt has remained free of injury and self harm    Goal Outcome Evaluation:

## 2022-12-24 NOTE — PLAN OF CARE
"Face to face end of shift report communicated to oncoming shift.     Thelma Montelongo RN  12/24/2022  3:13 PM    Problem: Adult Behavioral Health Plan of Care  Goal: Patient-Specific Goal (Individualization)  Description: Patient will sleep 6 to 8 hours per night  Patient will eat at least 50% of meals  Patient will attend at least 50% of groups  Patient will comply with recommendations of treatment team  Patient will remain medication compliant    Outcome: Progressing  Note: Patient spends this morning in lounge eating breakfast, patient eats 100% of both meals.  Patient watches tv and socializes with peers.    Patient endorses depression and anxiety, \"just not feeling ready for discharge\"     Patient denies pain at this time.      Patient polite and cooperative with all nursing cares and assessment.  Patient denies AH, VH and HI.  Passive SI thoughts.      Goal Outcome Evaluation:                        "

## 2022-12-25 PROCEDURE — 250N000013 HC RX MED GY IP 250 OP 250 PS 637: Performed by: NURSE PRACTITIONER

## 2022-12-25 PROCEDURE — 99232 SBSQ HOSP IP/OBS MODERATE 35: CPT | Performed by: NURSE PRACTITIONER

## 2022-12-25 PROCEDURE — 250N000013 HC RX MED GY IP 250 OP 250 PS 637: Performed by: STUDENT IN AN ORGANIZED HEALTH CARE EDUCATION/TRAINING PROGRAM

## 2022-12-25 PROCEDURE — 124N000001 HC R&B MH

## 2022-12-25 RX ADMIN — VENLAFAXINE HYDROCHLORIDE 225 MG: 75 CAPSULE, EXTENDED RELEASE ORAL at 21:31

## 2022-12-25 RX ADMIN — MULTIPLE VITAMINS W/ MINERALS TAB 1 TABLET: TAB at 21:31

## 2022-12-25 ASSESSMENT — ACTIVITIES OF DAILY LIVING (ADL)
ADLS_ACUITY_SCORE: 40
DRESS: INDEPENDENT;SCRUBS (BEHAVIORAL HEALTH)
ORAL_HYGIENE: INDEPENDENT
ADLS_ACUITY_SCORE: 40
HYGIENE/GROOMING: INDEPENDENT
LAUNDRY: UNABLE TO COMPLETE
ADLS_ACUITY_SCORE: 40
ADLS_ACUITY_SCORE: 40

## 2022-12-25 NOTE — PLAN OF CARE
Problem: Adult Behavioral Health Plan of Care  Goal: Patient-Specific Goal (Individualization)  Description: Patient will sleep 6 to 8 hours per night  Patient will eat at least 50% of meals  Patient will attend at least 50% of groups  Patient will comply with recommendations of treatment team  Patient will remain medication compliant    Outcome: Progressing  Note: Report received from Danya. Rounding complete. Pt observed sleeping in supine position with regular and unlabored respirations.    Pt has been in bed with eyes closed and regular respirations. 15 minute and PRN checks all night. No complaints offered. Will continue to monitor.    Pt up briefly for a snack and then back to their room.     Pt slept approx 4.5   hours this NOC shift.    Face to face end of shift report communicated to oncoming RN.    Chelsea NIXON RN  December 25, 2022  3:43 AM          Problem: Suicidal Behavior  Goal: Suicidal Behavior is Absent or Managed  Description: Pt will be free of self harm while on unit.   Outcome: Progressing  Note: Unable to assess due to pt sleeping. Pt has remained free of self-harm.       Problem: Suicide Risk  Goal: Absence of Self-Harm  Outcome: Progressing  Note: Unable to assess due to pt sleeping. Pt has remained free of self-harm.     Goal Outcome Evaluation:

## 2022-12-25 NOTE — PLAN OF CARE
"  Problem: Adult Behavioral Health Plan of Care  Goal: Patient-Specific Goal (Individualization)  Description: Patient will sleep 6 to 8 hours per night  Patient will eat at least 50% of meals  Patient will attend at least 50% of groups  Patient will comply with recommendations of treatment team  Patient will remain medication compliant    Note: Patient laying in bed resting/napping for majority of morning. Up to unit shortly before lunch, playing games with peers, watching television and attending some groups. Affect is a bit flat, but friendly during conversation. Denies HI, hallucinations, anxiety and pain at this time. Reports continued depression and \"some\" thoughts of SI with no plan. Patient does contract for safety if having any thoughts of self harm while on the unit.   Maintains appropriate boundaries with all staff and peers. Reports eating and sleeping well.  No medications administered this shift. No concerns at this time. Continue to monitor.      Problem: Suicidal Behavior  Goal: Suicidal Behavior is Absent or Managed  Description: Pt will be free of self harm while on unit.   Note: Continue to monitor at this time.     Face to face end of shift report communicated to oncoming RN.     Ysabel Barboza RN  12/25/2022  9:53 AM       "

## 2022-12-25 NOTE — PLAN OF CARE
Problem: Adult Behavioral Health Plan of Care  Goal: Patient-Specific Goal (Individualization)  Description: Patient will sleep 6 to 8 hours per night  Patient will eat at least 50% of meals  Patient will attend at least 50% of groups  Patient will comply with recommendations of treatment team  Patient will remain medication compliant    Outcome: Progressing     Pt in the lounge watching tv with peers at the start of the shift. Pt cooperative with nursing assessment, denies pain, anxiety, SI, HI and hallucinations. Pt does report depression that goes up and down. Pt attended 2 groups this shift. Pt requested to take meds at 2200, pt requested trazodone 50mg at 2153.       Problem: Suicidal Behavior  Goal: Suicidal Behavior is Absent or Managed  Description: Pt will be free of self harm while on unit.   Outcome: Progressing     Problem: Suicide Risk  Goal: Absence of Self-Harm  Outcome: Progressing   Goal Outcome Evaluation:         Face to face end of shift report communicated to night shift RN.     Darcy Peace RN  12/24/2022  10:10 PM

## 2022-12-25 NOTE — PROGRESS NOTES
"[facility] PSYCHIATRY  PROGRESS NOTE     SUBJECTIVE     Prior to interviewing the patient, I met with nursing and reviewed patient's clinical condition. We discussed clinical care both before and after the interview. I have reviewed the patient's clinical course by review of records including previous notes, labs, and vital signs.     Per nursing, the patient had the following behavioral events over the last 24-hours: Staff found vape pen in patient's room    Patient is up on the unit reading a bible with a peer.  They deny homicidal thought or hallucination and continues to endorse suicidal thoughts, especially when waking up for the day, denies plan on the unit and mood \"still depressed\".  We again explore other options or increasing venlafaxine, patient declines as he had good result with venlafaxine 225mg in the past and is hopeful for mood change soon.       MEDICATIONS   Scheduled Meds:    multivitamin w/minerals  1 tablet Oral Daily     venlafaxine  225 mg Oral At Bedtime     PRN Meds:.acetaminophen, albuterol, alum & mag hydroxide-simethicone, clonazePAM, hydrOXYzine, melatonin, OLANZapine **OR** OLANZapine, senna-docusate, traZODone     ALLERGIES   Allergies   Allergen Reactions     Cats Rash     Asthma: Trouble breathing and rash           Molds & Smuts      asthma     Amoxicillin Unknown     Family allergy     Pcn [Penicillins] Unknown     Family allergy. Pt states she was told by parents to avoid this as they are allergic       Smoke.      Factory smoke        MENTAL STATUS EXAM   Vitals: /64   Pulse 74   Temp 97.2  F (36.2  C) (Temporal)   Resp 14   Wt 98.3 kg (216 lb 11.2 oz)   SpO2 95%   BMI 31.73 kg/m      Appearance: Alert, oriented, dressed in hospital scrubs  Attitude: Cooperative   Eye Contact: Fair   Mood: \"still depressed\"  Affect: Full Range  Speech: Normal rate and rhythm   Psychomotor Behavior: No TD or rigidity. No tremor or akathisia.   Thought Process: goal " directed  Associations: No loose associations   Thought Content: Endorses suicidal thoughts, Denies AVH. No evidence of delusional thought  Insight: Adequate  Judgment: Fair  Oriented to: Person, place, and time  Attention Span and Concentration: Intact  Recent and Remote Memory: Intact  Language: English with appropriate syntax and vocabulary  Fund of Knowledge: Average   Muscle Strength and Tone: Grossly normal  Gait and Station: Grossly normal       LABS   No results found for this or any previous visit (from the past 24 hour(s)).      IMPRESSION     Non-binary 32 year old patient who presents with recent worsening mood and suicidal thoughts in the context of grief. They will need a brief observation and stabilization admission. Increasing Effexor to address depression.    Today: Patient continues with depressed mood and transient suicidal thoughts, is hoping to discharge early this week.       DIAGNOSES     1. Major depressive disorder, recurrent, severe without psychosis  2. BPD  3. PTSD       PLAN     Location: Unit 5  Legal Status: Orders Placed This Encounter      Legal status Voluntary    Safety Assessment:    Behavioral Orders   Procedures     Code 1 - Restrict to Unit     Routine Programming     As clinically indicated     Status 15     Every 15 minutes.      PTA psychotropic medications continued/changed:     - Effexor  mg daily -> 225 mg at bedtime as of 12/19  - Trazodone 50 mg prn sleep, may repeat x 2    New medications initiated:     - Standard unit prns     Today's Changes:    None - may consider another increase in Effexor or adding Abilify    Programming: Patient will be treated in a therapeutic milieu with appropriate individual and group therapies. Education will be provided on diagnoses, medications, and treatments.     Medical diagnoses:  Per medicine    Consult: None  Tests: None    Anticipated LOS: 1-3 more days    Disposition: Home with outpatient services. Recommend DBT through  [facility].       ATTESTATION      Rose Guo, CNP

## 2022-12-25 NOTE — PLAN OF CARE
Problem: Adult Behavioral Health Plan of Care  Goal: Patient-Specific Goal (Individualization)  Description: Patient will sleep 6 to 8 hours per night  Patient will eat at least 50% of meals  Patient will attend at least 50% of groups  Patient will comply with recommendations of treatment team  Patient will remain medication compliant    Outcome: Progressing   pt up in the lounge at the start of the shift. Pt attended groups, socialized with peers and watched a movie this shift. Pt stated she had a pretty good night but had some anxiety at the beginning, pt had P print out lyrics to songs he thought would be helpful to himself and others.     Pt cooperative and pleasant, able to make needs known.         Problem: Suicidal Behavior  Goal: Suicidal Behavior is Absent or Managed  Description: Pt will be free of self harm while on unit.   Outcome: Progressing   Goal Outcome Evaluation:     Plan of Care Reviewed With: patient         Face to face end of shift report communicated to night shift RN. Reported that pt is a risk for suicide.     Darcy Peace RN  12/25/2022  3:58 PM

## 2022-12-26 PROCEDURE — 250N000013 HC RX MED GY IP 250 OP 250 PS 637: Performed by: PSYCHIATRY & NEUROLOGY

## 2022-12-26 PROCEDURE — 124N000001 HC R&B MH

## 2022-12-26 PROCEDURE — 250N000013 HC RX MED GY IP 250 OP 250 PS 637: Performed by: NURSE PRACTITIONER

## 2022-12-26 PROCEDURE — 250N000013 HC RX MED GY IP 250 OP 250 PS 637: Performed by: STUDENT IN AN ORGANIZED HEALTH CARE EDUCATION/TRAINING PROGRAM

## 2022-12-26 PROCEDURE — 99232 SBSQ HOSP IP/OBS MODERATE 35: CPT | Performed by: NURSE PRACTITIONER

## 2022-12-26 RX ADMIN — VENLAFAXINE HYDROCHLORIDE 225 MG: 75 CAPSULE, EXTENDED RELEASE ORAL at 21:01

## 2022-12-26 RX ADMIN — MULTIPLE VITAMINS W/ MINERALS TAB 1 TABLET: TAB at 17:18

## 2022-12-26 RX ADMIN — OLANZAPINE 10 MG: 10 TABLET, FILM COATED ORAL at 00:03

## 2022-12-26 RX ADMIN — CLONAZEPAM 1 MG: 1 TABLET ORAL at 00:03

## 2022-12-26 RX ADMIN — CLONAZEPAM 1 MG: 1 TABLET ORAL at 11:55

## 2022-12-26 RX ADMIN — OLANZAPINE 10 MG: 10 TABLET, FILM COATED ORAL at 21:01

## 2022-12-26 ASSESSMENT — ACTIVITIES OF DAILY LIVING (ADL)
ADLS_ACUITY_SCORE: 40
ADLS_ACUITY_SCORE: 40
DRESS: INDEPENDENT
ADLS_ACUITY_SCORE: 40
HYGIENE/GROOMING: INDEPENDENT
ADLS_ACUITY_SCORE: 40
ORAL_HYGIENE: INDEPENDENT

## 2022-12-26 NOTE — PLAN OF CARE
Problem: Adult Behavioral Health Plan of Care  Goal: Patient-Specific Goal (Individualization)  Description: Patient will sleep 6 to 8 hours per night  Patient will eat at least 50% of meals  Patient will attend at least 50% of groups  Patient will comply with recommendations of treatment team  Patient will remain medication compliant    Outcome: Progressing  Note: Report received from Danya. Rounding complete. Pt observed to nurse's station with c/o AH/VH, high anxiety, and not being able to get their mind to stop racing. Pt requested and was admin Zyprexa 10 mg and klonopin 1 mg and returned to their room. Pt advised to update writer if this did not help.  0145- Pt observed to be sleeping     Pt has been in bed with eyes closed and regular respirations. 15 minute and PRN checks all night. No complaints offered. Will continue to monitor.    Pt slept approx  5  hours this NOC shift.    Face to face end of shift report communicated to oncoming RN.    Chelsea NIXON RN  December 26, 2022  2:00 AM          Problem: Suicidal Behavior  Goal: Suicidal Behavior is Absent or Managed  Description: Pt will be free of self harm while on unit.   Outcome: Progressing  Note: Pt did not report SI and has remained free of injury and self harm   Goal Outcome Evaluation:

## 2022-12-26 NOTE — PLAN OF CARE
"  Problem: Adult Behavioral Health Plan of Care  Goal: Patient-Specific Goal (Individualization)  Description: Patient will sleep 6 to 8 hours per night  Patient will eat at least 50% of meals  Patient will attend at least 50% of groups  Patient will comply with recommendations of treatment team  Patient will remain medication compliant    Outcome: Progressing     Problem: Adult Behavioral Health Plan of Care  Goal: Develops/Participates in Therapeutic [place] to Support Successful Transition  Outcome: Progressing   Goal Outcome Evaluation:             Pt. Was sleeping until lunch arrived, awake and talked with the provider Urvashi, upset because \"she basically told me that I will be discharging by Wednesday.  I hope I'm ready by then\", encouragement and support given, slept 5 hours last night, plus this morning, eating at least 50% of lunch, is medication compliant, endorses high anxiety, depression and intermittent suicidal ideation, requested/ received klonopin 1 mg po at 1155 for high anxiety, will continue to monitor progress.    Face to face end of shift report will be communicated to oncoming afternoon shift RN.     Chelsea Desai RN  12/26/2022  12:34 PM                 "

## 2022-12-26 NOTE — PROGRESS NOTES
"[facility] PSYCHIATRY  PROGRESS NOTE     SUBJECTIVE     Prior to interviewing the patient, I met with nursing and reviewed patient's clinical condition. We discussed clinical care both before and after the interview. I have reviewed the patient's clinical course by review of records including previous notes, labs, and vital signs.     Per nursing, the patient had the following behavioral events over the last 24-hours: None.     Patient found bed resting prior to our meeting, easily aroused by voice. The patient continues to report depression and SI with no change since admission. Patient states they don't know what will make them feel better. Discussed tentative plan to discharge Wednesday with patient stating they don't feel they will be ready by them as it takes \"a minimum of 6 weeks before these medications even start to work - any pharmacist will tell you that stabilizing in less than 6 week is unrealistic.\" Explored crisis plan vs. Potential step down options with patient though patient was not impressed as they do not believe they can maintain safety in any setting outside of the hospital setting. Group attendance encouraged. Patient states he was told by his therapist that they need to listen to their body and right now his body is telling them it needs rest.      MEDICATIONS   Scheduled Meds:    multivitamin w/minerals  1 tablet Oral Daily     venlafaxine  225 mg Oral At Bedtime     PRN Meds:.acetaminophen, albuterol, alum & mag hydroxide-simethicone, clonazePAM, hydrOXYzine, melatonin, OLANZapine **OR** OLANZapine, senna-docusate, traZODone     ALLERGIES   Allergies   Allergen Reactions     Cats Rash     Asthma: Trouble breathing and rash           Molds & Smuts      asthma     Amoxicillin Unknown     Family allergy     Pcn [Penicillins] Unknown     Family allergy. Pt states she was told by parents to avoid this as they are allergic       Smoke.      Factory smoke        MENTAL STATUS EXAM   Vitals: BP " "115/75   Pulse 89   Temp 97.8  F (36.6  C) (Tympanic)   Resp 16   Wt 98.3 kg (216 lb 11.2 oz)   SpO2 96%   BMI 31.73 kg/m      Appearance: Alert, oriented, dressed in hospital scrubs  Attitude: Cooperative   Eye Contact: Fair   Mood: \"no change\"  Affect: Flat, depressed  Speech: Normal rate and rhythm   Psychomotor Behavior: No TD or rigidity. No tremor or akathisia.   Thought Process: goal directed  Associations: No loose associations   Thought Content: Endorses suicidal thoughts, Denies AVH. No evidence of delusional thought  Insight: Adequate  Judgment: Fair  Oriented to: Person, place, and time  Attention Span and Concentration: Intact  Recent and Remote Memory: Intact  Language: English with appropriate syntax and vocabulary  Fund of Knowledge: Average   Muscle Strength and Tone: Grossly normal  Gait and Station: Grossly normal       LABS   No results found for this or any previous visit (from the past 24 hour(s)).      IMPRESSION     Non-binary 32 year old patient who presents with recent worsening mood and suicidal thoughts in the context of grief. They will need a brief observation and stabilization admission. Increasing Effexor to address depression.    Today: Patient reports no change in their mood/SI since admission stating ongoing hospitalization is necessary for them to maintain safety. Patient unwilling to consider any less restrictive/step down options.       DIAGNOSES     1. Major depressive disorder, recurrent, severe without psychosis  2. BPD  3. PTSD       PLAN     Location: Unit 5  Legal Status: Orders Placed This Encounter      Legal status Voluntary    Safety Assessment:    Behavioral Orders   Procedures     Code 1 - Restrict to Unit     Routine Programming     As clinically indicated     Status 15     Every 15 minutes.      PTA psychotropic medications continued/changed:     - Effexor  mg daily -> 225 mg at bedtime as of 12/19  - Trazodone 50 mg prn sleep, may repeat x 2    New " medications initiated:     - Standard unit prns     Today's Changes:    None - may consider another increase in Effexor or adding Abilify    Programming: Patient will be treated in a therapeutic milieu with appropriate individual and group therapies. Education will be provided on diagnoses, medications, and treatments.     Medical diagnoses:  Per medicine    Consult: None  Tests: None    Anticipated LOS: Likely discharge Wednesday.    Disposition: Home with outpatient services. Recommend DBT through [facility].       TREATMENT TEAM CARE PLAN     Progress: Continued symptoms.    Continued Stay Criteria/Rationale: Continued symptoms without sufficient improvement/resolution.    Medical/Physical: See above.    Precautions: See above.     Plan: Continue inpatient care with unit support and medication management.    Rationale for change in precautions or plan: NA due to no change.    Participants: Maki Hodges NP, Nursing, SW, OT.    The patient's care was discussed with the treatment team and chart notes were reviewed.       ATTESTATION      Maki Hodges NP

## 2022-12-27 PROCEDURE — 250N000013 HC RX MED GY IP 250 OP 250 PS 637: Performed by: NURSE PRACTITIONER

## 2022-12-27 PROCEDURE — 99232 SBSQ HOSP IP/OBS MODERATE 35: CPT | Performed by: NURSE PRACTITIONER

## 2022-12-27 PROCEDURE — 124N000001 HC R&B MH

## 2022-12-27 PROCEDURE — 250N000013 HC RX MED GY IP 250 OP 250 PS 637: Performed by: PSYCHIATRY & NEUROLOGY

## 2022-12-27 PROCEDURE — 250N000013 HC RX MED GY IP 250 OP 250 PS 637: Performed by: STUDENT IN AN ORGANIZED HEALTH CARE EDUCATION/TRAINING PROGRAM

## 2022-12-27 RX ADMIN — VENLAFAXINE HYDROCHLORIDE 225 MG: 75 CAPSULE, EXTENDED RELEASE ORAL at 21:58

## 2022-12-27 RX ADMIN — MULTIPLE VITAMINS W/ MINERALS TAB 1 TABLET: TAB at 17:15

## 2022-12-27 RX ADMIN — OLANZAPINE 10 MG: 10 TABLET, FILM COATED ORAL at 22:00

## 2022-12-27 RX ADMIN — CLONAZEPAM 1 MG: 1 TABLET ORAL at 22:01

## 2022-12-27 ASSESSMENT — ACTIVITIES OF DAILY LIVING (ADL)
ORAL_HYGIENE: INDEPENDENT
ADLS_ACUITY_SCORE: 40
DRESS: INDEPENDENT
ADLS_ACUITY_SCORE: 40
HYGIENE/GROOMING: INDEPENDENT

## 2022-12-27 NOTE — PLAN OF CARE
Problem: Adult Behavioral Health Plan of Care  Goal: Patient-Specific Goal (Individualization)  Description: Patient will sleep 6 to 8 hours per night  Patient will eat at least 50% of meals  Patient will attend at least 50% of groups  Patient will comply with recommendations of treatment team  Patient will remain medication compliant  Outcome: Progressing     Goal Outcome Evaluation:        Pt is up on the unit t/o the evening, attending groups and interacting with peers. Ate 100% of supper meal, denied any physical complaint. Reported some SI and depression continues, anxiety is low and manageable after earlier Klonopin. Pt did request PRN Zyprexa at  with scheduled Effexor. Pt stated he is likely leaving Thursday.    Problem: Suicidal Behavior  Goal: Suicidal Behavior is Absent or Managed  Description: Pt will be free of self harm while on unit.   Outcome: Progressing    2330 - Face to face end of shift report to be communicated to oncoming shift RN.     Deisy Felton RN  12/26/2022  11:08 PM

## 2022-12-27 NOTE — PLAN OF CARE
Problem: Adult Behavioral Health Plan of Care  Goal: Patient-Specific Goal (Individualization)  Description: Patient will sleep 6 to 8 hours per night  Patient will eat at least 50% of meals  Patient will attend at least 50% of groups  Patient will comply with recommendations of treatment team  Patient will remain medication compliant    Outcome: Progressing     Problem: Suicidal Behavior  Goal: Suicidal Behavior is Absent or Managed  Description: Pt will be free of self harm while on unit.   Outcome: Progressing   Goal Outcome Evaluation:         Pt. Was up and ate breakfast in dayroom, slept 5.5 hours last night, eating at least 50% of meals, is medication compliant, endorses depression, anxiety and intermittent suicidal ideation, verbally contracts for safety, encouraged to attend group therapy sessions, went right back to bed after eating breakfast, will continue to monitor progress.    Face to face end of shift report will be communicated to oncoming afternoon shift RN.     Chelsea Desai RN  12/27/2022  11:10 AM

## 2022-12-27 NOTE — PLAN OF CARE
"Spoke with pt this afternoon with their provider. Pt was found sleeping in bed and states their therapist told them it was a good coping skill. Pt states they would like to stay until the 31st due to adjusting to medications. Explained that the plan will still be Wednesday or possibly Thursday per pt's request. Pt stated \"that is medical fraudulent\" when asked to explain the pt states \"If I'm still having suicidal thoughts and you guys discharge me and I hurt myself, you're liable\". Encouraged and informed pt that it's not guaranteed to stop having suicidal thoughts and that people will almost always still have them but its how we handle them with our coping skills that helps us move past them. Pt states \"no not for me\". He continues stating \"I would like to speak with the [employer]mb[employer]smen\". Informed pt that the number was in his pt rights packet.     Called [facility] to schedule pt a PCP appointment. They state in order to establish care the pt would need their address in [place]. Pt was unable to give this writer their address and states \"I just moved there and haven't memorized it yet\". The clinic states the pt will have to call once he has his address to establish an appointment.    Pt is scheduled with psychiatry and therapy via telehealth with [facility].    "

## 2022-12-27 NOTE — PLAN OF CARE
Problem: Adult Behavioral Health Plan of Care  Goal: Patient-Specific Goal (Individualization)  Description: Patient will sleep 6 to 8 hours per night  Patient will eat at least 50% of meals  Patient will attend at least 50% of groups  Patient will comply with recommendations of treatment team  Patient will remain medication compliant    Outcome: Progressing     Problem: Suicidal Behavior  Goal: Suicidal Behavior is Absent or Managed  Description: Pt will be free of self harm while on unit.   Outcome: Progressing     Face to face shift report received from Deisy VELIZ. Rounding completed, pt observed.     Pt appeared to sleep 5.5 hours this shift. Pt did not have any noted episodes of self harm this shift.    Face to face report will be communicated to oncoming JOSE ALFREDO.    Adwoa Light RN  12/27/2022  6:11 AM

## 2022-12-27 NOTE — PROGRESS NOTES
"[facility] PSYCHIATRY  PROGRESS NOTE     SUBJECTIVE     Prior to interviewing the patient, I met with nursing and reviewed patient's clinical condition. We discussed clinical care both before and after the interview. I have reviewed the patient's clinical course by review of records including previous notes, labs, and vital signs.     Per nursing, the patient had the following behavioral events over the last 24-hours: None.     Spoke with patient this afternoon with . Same as yesterday, patient was found sleeping in bed and states their therapist told them it was a good coping skill, notes they are not ready to discharge due to ongoing suicidal thoughts. Patient states they would like to stay until the 31st due to adjusting to medications. Explained that the plan will still be Wednesday or possibly Thursday per patient's request. Patient stated \"that is medical fraudulent\" when asked to explain the pt states \"If I'm still having suicidal thoughts and you guys discharge me and I hurt myself, you're liable\".  encouraged and informed patient that it's common to still have them, but its how we handle them with our coping skills that helps us move past them. Pt states \"no, not for me\". He continues stating \"I would like to speak with the [facility]\". Informed patient that the number was in his patient rights packet. Patient informed that he needs to be attending group.      MEDICATIONS   Scheduled Meds:    multivitamin w/minerals  1 tablet Oral Daily     venlafaxine  225 mg Oral At Bedtime     PRN Meds:.acetaminophen, albuterol, alum & mag hydroxide-simethicone, clonazePAM, hydrOXYzine, melatonin, OLANZapine **OR** OLANZapine, senna-docusate, traZODone     ALLERGIES   Allergies   Allergen Reactions     Cats Rash     Asthma: Trouble breathing and rash           Molds & Smuts      asthma     Amoxicillin Unknown     Family allergy     Pcn [Penicillins] Unknown     Family allergy. Pt states she " "was told by parents to avoid this as they are allergic       Smoke.      Factory smoke        MENTAL STATUS EXAM   Vitals: /84   Pulse 100   Temp 99.1  F (37.3  C) (Temporal)   Resp 18   Wt 98.3 kg (216 lb 11.2 oz)   SpO2 96%   BMI 31.73 kg/m      Appearance: Alert, oriented, dressed in hospital scrubs  Attitude: Cooperative   Eye Contact: Fair   Mood: \"no change\"  Affect: Flat, depressed  Speech: Normal rate and rhythm   Psychomotor Behavior: No TD or rigidity. No tremor or akathisia.   Thought Process: goal directed  Associations: No loose associations   Thought Content: Endorses suicidal thoughts, Denies AVH. No evidence of delusional thought  Insight: Adequate  Judgment: Fair  Oriented to: Person, place, and time  Attention Span and Concentration: Intact  Recent and Remote Memory: Intact  Language: English with appropriate syntax and vocabulary  Fund of Knowledge: Average   Muscle Strength and Tone: Grossly normal  Gait and Station: Grossly normal       LABS   No results found for this or any previous visit (from the past 24 hour(s)).      IMPRESSION     Non-binary 32 year old patient who presents with recent worsening mood and suicidal thoughts in the context of grief. They will need a brief observation and stabilization admission. Increasing Effexor to address depression.    Today: Patient reports no change in their mood/SI since admission stating ongoing hospitalization is necessary for them to maintain safety. Patient not participating in unit programming. Notified on plan to discharge tomorrow, no later than Thursday per patient request. Patient not agreeable with this plan. Threatening to call the Omb[employer]smen.         DIAGNOSES     1. Major depressive disorder, recurrent, severe without psychosis  2. BPD  3. PTSD       PLAN     Location: Unit 5  Legal Status: Orders Placed This Encounter      Legal status Voluntary    Safety Assessment:    Behavioral Orders   Procedures     Code 1 - Restrict to " Unit     Routine Programming     As clinically indicated     Status 15     Every 15 minutes.      PTA psychotropic medications continued/changed:     - Effexor  mg daily -> 225 mg at bedtime as of 12/19  - Trazodone 50 mg prn sleep, may repeat x 2    New medications initiated:     - Standard unit prns     Today's Changes:    None - may consider another increase in Effexor or adding Abilify    Programming: Patient will be treated in a therapeutic milieu with appropriate individual and group therapies. Education will be provided on diagnoses, medications, and treatments.     Medical diagnoses:  Per medicine    Consult: None  Tests: None    Anticipated LOS: Discharge Wednesday or Thursday at the latest.    Disposition: Home with outpatient services. Recommend DBT through [facility].       ATTESTATION      Maki Hodges NP

## 2022-12-27 NOTE — PLAN OF CARE
Face to face shift report received from Chelsea HEARD RN. Rounding completed, pt observed.    Problem: Adult Behavioral Health Plan of Care  Goal: Patient-Specific Goal (Individualization)  Description: Patient will sleep 6 to 8 hours per night  Patient will eat at least 50% of meals  Patient will attend at least 50% of groups  Patient will comply with recommendations of treatment team  Patient will remain medication compliant    Outcome: Progressing  Note: Shift Summary:  Patient was participating in group at the start of this shift. Patient is cooperative with nurse.  Admits to some fleeting SI but agrees to safety plan with staff.  Social with peers.  Compliant with medications.  Appetite is WNL Denies pain or unwanted side effects.  Gait is balanced and steady.     Problem: Suicidal Behavior  Goal: Suicidal Behavior is Absent or Managed  Description: Pt will be free of self harm while on unit.   Outcome: Progressing  Face to face report will be communicated to oncoming RN.    Lois Dial RN  12/27/2022

## 2022-12-28 VITALS
TEMPERATURE: 97.1 F | HEART RATE: 86 BPM | DIASTOLIC BLOOD PRESSURE: 66 MMHG | SYSTOLIC BLOOD PRESSURE: 122 MMHG | RESPIRATION RATE: 18 BRPM | BODY MASS INDEX: 31.73 KG/M2 | OXYGEN SATURATION: 96 % | WEIGHT: 216.7 LBS

## 2022-12-28 PROCEDURE — 99239 HOSP IP/OBS DSCHRG MGMT >30: CPT | Performed by: STUDENT IN AN ORGANIZED HEALTH CARE EDUCATION/TRAINING PROGRAM

## 2022-12-28 RX ORDER — VENLAFAXINE HYDROCHLORIDE 150 MG/1
150 CAPSULE, EXTENDED RELEASE ORAL AT BEDTIME
Qty: 30 CAPSULE | Refills: 1 | Status: SHIPPED | OUTPATIENT
Start: 2022-12-28

## 2022-12-28 RX ORDER — TRAZODONE HYDROCHLORIDE 50 MG/1
50-150 TABLET, FILM COATED ORAL
Qty: 60 TABLET | Refills: 1 | Status: SHIPPED | OUTPATIENT
Start: 2022-12-28

## 2022-12-28 RX ORDER — CLONAZEPAM 1 MG/1
1 TABLET ORAL 2 TIMES DAILY PRN
Qty: 30 TABLET | Refills: 0 | Status: SHIPPED | OUTPATIENT
Start: 2022-12-28

## 2022-12-28 RX ORDER — VENLAFAXINE HYDROCHLORIDE 75 MG/1
75 CAPSULE, EXTENDED RELEASE ORAL AT BEDTIME
Qty: 30 CAPSULE | Refills: 1 | Status: SHIPPED | OUTPATIENT
Start: 2022-12-28

## 2022-12-28 ASSESSMENT — ACTIVITIES OF DAILY LIVING (ADL)
ADLS_ACUITY_SCORE: 40

## 2022-12-28 NOTE — DISCHARGE SUMMARY
[facility] PSYCHIATRY  DISCHARGE SUMMARY     DISCHARGE DATA     Hao Harvey MRN# [id number]   Age: 32 year old YOB: 1990     Date of Admission: 12/17/2022  Date of Discharge: 12/28/2022  Discharge Provider: Veto Epstein DO       REASON FOR ADMISSION     Non-binary 32 year old patient who presents with recent worsening mood and suicidal thoughts in the context of grief. They will need a brief observation and stabilization admission. Increasing Effexor to address depression.       DISCHARGE DIAGNOSES     1. Major depressive disorder, recurrent, severe without psychosis  2. BPD  3. PTSD       CONSULTS     None       HOSPITAL COURSE   Psychiatric Course:    Legal status: Orders Placed This Encounter      Legal status Voluntary    Patient was admitted to unit 5 due to the aforementioned presentation. The patient was placed under 15 minute checks to ensure patient safety. The patient participated in unit programming and groups as able.    Ms. Harvey did not require seclusion/restraint during hospitalization.     We reviewed with Ms. Harvey current and past medication trials including duration, dose, response and side effects. During this hospitalization, the following changes to the patient's psychotropic medications were made:    PTA psychotropic medications continued/changed:      - Effexor  mg daily increased to 225 mg at bedtime as of 12/19  - Trazodone 50 mg prn sleep, may repeat x 2 instead of 3 to reduce side effects     New medications initiated:      - None    The patient's depression and SI improved with medication changes and support on the unit. The patient continued to have intermittent SI, as anticipated, given the chronic nature of these symptoms as a function of BPD. The patient felt safe to discharge and did not have any suicide plan at the time of discharge. He felt like the hospital helped him with everything that could be. Discussed safety planning with review of crisis  numbers and plan if acute SI returns.     With these changes and supports the patient noticed improvement in their symptoms and felt sufficiently ready for discharge. As a result, Hao Harvey was discharged. At the time of discharge, Hao Harvey was determined to not be a danger to self or others. At the current time of discharge, the patient does not meet criteria for involuntary hospitalization. On the day of discharge, the patient reports that they do not have suicidal or homicidal ideation. Steps taken to minimize risk include: assessing patient s behavior and thought process daily during hospital stay, discharging patient with adequate plan for follow up for mental and physical health and discussing safety plan of returning to the hospital should the patient ever have thoughts of harming themselves or others. Therefore, based on all available evidence including the factors cited above, the patient does not appear to be at imminent risk for self-harm, and is appropriate for outpatient level of care. However, if patient uses substances or is medication non-adherent, their risk of decompensation and SI will be elevated. This was discussed with the patient.    Medical Course:    The patient was medically cleared for admission to inpatient psychiatry. No medical issues arose. The patient was medically stable at the time of discharge.        DISCHARGE MEDICATIONS     Current Discharge Medication List      CONTINUE these medications which have CHANGED    Details   clonazePAM (KLONOPIN) 1 MG tablet Take 1 tablet (1 mg) by mouth 2 times daily as needed for anxiety  Qty: 30 tablet, Refills: 0    Associated Diagnoses: Severe major depression without psychotic features (H)      traZODone (DESYREL) 50 MG tablet Take 1-3 tablets ( mg) by mouth nightly as needed for sleep  Qty: 60 tablet, Refills: 1    Associated Diagnoses: Severe major depression without psychotic features (H)      !! venlafaxine (EFFEXOR XR) 150  "MG 24 hr capsule Take 1 capsule (150 mg) by mouth At Bedtime With 75 mg (total of 225 mg).  Qty: 30 capsule, Refills: 1    Associated Diagnoses: Severe major depression without psychotic features (H)      !! venlafaxine (EFFEXOR XR) 75 MG 24 hr capsule Take 1 capsule (75 mg) by mouth At Bedtime With 150 mg (total of 225 mg).  Qty: 30 capsule, Refills: 1    Associated Diagnoses: Severe major depression without psychotic features (H)       !! - Potential duplicate medications found. Please discuss with provider.      CONTINUE these medications which have NOT CHANGED    Details   Multiple Vitamins-Minerals (MULTIVITAMIN GUMMIES ADULTS PO) Take 1 Dose by mouth daily (1 dose= 2 gummies)      VENTOLIN  (90 Base) MCG/ACT inhaler Inhale 1-2 puffs into the lungs every 4 hours as needed              MENTAL STATUS EXAM   Vitals: /66   Pulse 86   Temp 97.1  F (36.2  C) (Temporal)   Resp 18   Wt 98.3 kg (216 lb 11.2 oz)   SpO2 96%   BMI 31.73 kg/m      Appearance: Alert, oriented, dressed in hospital scrubs  Attitude: Cooperative   Eye Contact: Fair  Mood: \"A little better\"  Affect: Full Range  Speech: Normal rate and rhythm   Psychomotor Behavior: No TD or rigidity. No tremor or akathisia.   Thought Process: goal directed  Associations: No loose associations   Thought Content: No SI or plan. No SIB. No AVH. No evidence of delusional thought  Insight: Adequate  Judgment: Fair  Oriented to: Person, place, and time  Attention Span and Concentration: Intact  Recent and Remote Memory: Intact  Language: English with appropriate syntax and vocabulary  Fund of Knowledge: Average   Muscle Strength and Tone: Grossly normal  Gait and Station: Grossly normal        DISCHARGE PLAN     1.  Education given regarding diagnostic and treatment options with risks, benefits and alternatives with adequate verbalization of understanding.  2.  Discharge to home. Upon detailed review of risk factors, patient amenable for release. "   3.  Continue aforementioned medications and associated medication changes with follow-up by outpatient provider.  4.  Crisis management planning in place.    5.  Nursing and  to review further discharge recommendations.   6.  Patient is being discharged with the following appointments:    Health Care Follow-up:      [facility]  Therapy: Kathy Segovia- Wednesday March 15th @ 11:00 AM Via Phone  Med Management: Vince Matta- Wednesday February 1st @ 2:30 PM   [address] [place] [address] [address]  [place] [address]  Phone: [phone]    Linus and Namita   DBT  Call [phone]  Multiple locations, including virtual options    7. General discharge instructions:       Reason for your hospital stay    Depression and SI.     Follow-up and recommended labs and tests    See  Recommendations for outpatient follow-up appointments. No follow-up labs.     Activity    Your activity upon discharge: activity as tolerated.     Discharge Instructions    Please continue to take your medications as prescribed. Please also practice healthy lifestyle choices, including exercise, healthy diet, stress management, adequate sleep, and cultivating supportive relationships. Please also avoid use of any substances as best as able. Please return to the ED if your symptoms worsen or you do not feel safe.     Diet    Follow this diet upon discharge: Orders Placed This Encounter      Regular Diet Adult           DISCHARGE SERVICES PROVIDED     45 minutes spent on discharge services, including:  Final examination of patient.  Review and discussion of hospital stay.  Instructions for continued outpatient care/goals.  Preparation of discharge records.  Preparation of medications refills and new prescriptions.  Preparation of applicable referral forms.        ATTESTATION     Dr. Veto Epstein  Psychiatrist     VIDEO VISIT    Patient has given verbal consent for video visit?: Yes     Video-  Visit Details  Type of service:  video visit for mental health treatment.  Time of service:    Date:  12/28/2022    Video Start Time: 900 AM  Video End Time: 920 AM    Reason for video visit: COVID-19 [place]  Originating Site (patient location):  [facility]  Distant Site (provider location):  Remote location  Mode of Communication:  Video Conference via [employer] THIS ADMISSION     No results found for any visits on 12/17/22.

## 2022-12-28 NOTE — PLAN OF CARE
Problem: Adult Behavioral Health Plan of Care  Goal: Patient-Specific Goal (Individualization)  Description: Patient will sleep 6 to 8 hours per night  Patient will eat at least 50% of meals  Patient will attend at least 50% of groups  Patient will comply with recommendations of treatment team  Patient will remain medication compliant    Outcome: Progressing     Problem: Suicidal Behavior  Goal: Suicidal Behavior is Absent or Managed  Description: Pt will be free of self harm while on unit.   Outcome: Progressing     Face to face shift report received from Lois VELIZ. Rounding completed, pt observed.     Pt appeared to sleep most of the NOC.    Writer continues cares of pt for the day shift. Pt pleasant and cooperative with nursing assessment. Pt denies SI at this time and has not had any noted episodes of self harm. Pt does eat meals in the lobby and does socialize with peers.     Discharge Note    Patient Discharged to home on 12/28/2022 10:12 AM [employer] accompanied.     Patient informed of discharge instructions in AVS. patient verbalizes understanding and denies having any questions pertaining to AVS. Patient stable at time of discharge. Patient denies SI, HI, and thoughts of self harm at time of discharge. All personal belongings returned to patient. Discharge prescriptions sent to [facility] via electronic communication.     Adwoa Light RN  12/28/2022  10:12 AM

## 2022-12-28 NOTE — PLAN OF CARE
Pt is discharging at the recommendation of the treatment team. Pt is discharging to Home transported by [employer]. Pt denies having any thoughts of hurting themself or anyone else. Pt denies anxiety or depression. Pt has follow up with Med management and Therapy @ [facility] via telehealth. Discharge instructions, including; demographic sheet, psychiatric evaluation, discharge summary, and AVS were faxed to these next level of care providers.    Called and scheduled ride with [employer] for today 12/28 @ 10:00 AM. RN and provider notified. Written on whiteboard.